# Patient Record
Sex: FEMALE | Race: WHITE | NOT HISPANIC OR LATINO | Employment: OTHER | URBAN - METROPOLITAN AREA
[De-identification: names, ages, dates, MRNs, and addresses within clinical notes are randomized per-mention and may not be internally consistent; named-entity substitution may affect disease eponyms.]

---

## 2018-01-01 ENCOUNTER — HOSPITAL ENCOUNTER (OUTPATIENT)
Facility: HOSPITAL | Age: 73
Discharge: HOME | End: 2018-12-13
Attending: RADIOLOGY | Admitting: RADIOLOGY

## 2018-01-01 ENCOUNTER — OFFICE VISIT (OUTPATIENT)
Dept: NEUROSURGERY | Facility: CLINIC | Age: 73
End: 2018-01-01

## 2018-01-01 VITALS
OXYGEN SATURATION: 95 % | TEMPERATURE: 97.2 F | DIASTOLIC BLOOD PRESSURE: 74 MMHG | SYSTOLIC BLOOD PRESSURE: 106 MMHG | BODY MASS INDEX: 30.34 KG/M2 | RESPIRATION RATE: 18 BRPM | HEART RATE: 67 BPM | HEIGHT: 64 IN | WEIGHT: 177.69 LBS

## 2018-01-01 VITALS
DIASTOLIC BLOOD PRESSURE: 87 MMHG | HEART RATE: 77 BPM | SYSTOLIC BLOOD PRESSURE: 186 MMHG | HEIGHT: 64 IN | OXYGEN SATURATION: 93 % | BODY MASS INDEX: 29.4 KG/M2 | WEIGHT: 172.2 LBS

## 2018-01-01 DIAGNOSIS — I65.23 BILATERAL CAROTID ARTERY STENOSIS: Primary | ICD-10-CM

## 2018-01-01 DIAGNOSIS — I65.23 BILATERAL CAROTID ARTERY STENOSIS: ICD-10-CM

## 2018-01-01 DIAGNOSIS — I67.1 CEREBRAL ANEURYSM, NONRUPTURED: ICD-10-CM

## 2018-01-01 LAB
ANION GAP SERPL CALCULATED.3IONS-SCNC: 5 MMOL/L (ref 3–11)
BUN BLD-MCNC: 22 MG/DL (ref 9–23)
BUN/CREAT SERPL: 20.8 (ref 7–25)
CALCIUM SPEC-SCNC: 9 MG/DL (ref 8.7–10.4)
CHLORIDE SERPL-SCNC: 105 MMOL/L (ref 99–109)
CO2 SERPL-SCNC: 28 MMOL/L (ref 20–31)
CREAT BLD-MCNC: 1.06 MG/DL (ref 0.6–1.3)
DEPRECATED RDW RBC AUTO: 51 FL (ref 37–54)
ERYTHROCYTE [DISTWIDTH] IN BLOOD BY AUTOMATED COUNT: 16.3 % (ref 11.3–14.5)
GFR SERPL CREATININE-BSD FRML MDRD: 51 ML/MIN/1.73
GLUCOSE BLD-MCNC: 105 MG/DL (ref 70–100)
HCT VFR BLD AUTO: 36.9 % (ref 34.5–44)
HGB BLD-MCNC: 11.3 G/DL (ref 11.5–15.5)
MCH RBC QN AUTO: 25.9 PG (ref 27–31)
MCHC RBC AUTO-ENTMCNC: 30.6 G/DL (ref 32–36)
MCV RBC AUTO: 84.6 FL (ref 80–99)
PLATELET # BLD AUTO: 265 10*3/MM3 (ref 150–450)
PMV BLD AUTO: 10.3 FL (ref 6–12)
POTASSIUM BLD-SCNC: 3.8 MMOL/L (ref 3.5–5.5)
RBC # BLD AUTO: 4.36 10*6/MM3 (ref 3.89–5.14)
SODIUM BLD-SCNC: 138 MMOL/L (ref 132–146)
WBC NRBC COR # BLD: 6.96 10*3/MM3 (ref 3.5–10.8)

## 2018-01-01 PROCEDURE — 36223 PLACE CATH CAROTID/INOM ART: CPT | Performed by: RADIOLOGY

## 2018-01-01 PROCEDURE — C1894 INTRO/SHEATH, NON-LASER: HCPCS | Performed by: RADIOLOGY

## 2018-01-01 PROCEDURE — 36226 PLACE CATH VERTEBRAL ART: CPT | Performed by: RADIOLOGY

## 2018-01-01 PROCEDURE — 25010000002 MIDAZOLAM PER 1 MG: Performed by: RADIOLOGY

## 2018-01-01 PROCEDURE — 36225 PLACE CATH SUBCLAVIAN ART: CPT | Performed by: RADIOLOGY

## 2018-01-01 PROCEDURE — 0 IODIXANOL PER 1 ML: Performed by: RADIOLOGY

## 2018-01-01 PROCEDURE — C1769 GUIDE WIRE: HCPCS | Performed by: RADIOLOGY

## 2018-01-01 PROCEDURE — 76377 3D RENDER W/INTRP POSTPROCES: CPT | Performed by: RADIOLOGY

## 2018-01-01 PROCEDURE — 85027 COMPLETE CBC AUTOMATED: CPT | Performed by: RADIOLOGY

## 2018-01-01 PROCEDURE — 80048 BASIC METABOLIC PNL TOTAL CA: CPT | Performed by: RADIOLOGY

## 2018-01-01 PROCEDURE — 25010000002 FENTANYL CITRATE (PF) 100 MCG/2ML SOLUTION: Performed by: RADIOLOGY

## 2018-01-01 PROCEDURE — 99203 OFFICE O/P NEW LOW 30 MIN: CPT | Performed by: NEUROLOGICAL SURGERY

## 2018-01-01 PROCEDURE — 25010000002 HYDRALAZINE PER 20 MG: Performed by: RADIOLOGY

## 2018-01-01 PROCEDURE — A9270 NON-COVERED ITEM OR SERVICE: HCPCS | Performed by: RADIOLOGY

## 2018-01-01 PROCEDURE — 36415 COLL VENOUS BLD VENIPUNCTURE: CPT

## 2018-01-01 PROCEDURE — 63710000001 ACETAMINOPHEN 325 MG TABLET: Performed by: RADIOLOGY

## 2018-01-01 PROCEDURE — 25010000002 HEPARIN (PORCINE) PER 1000 UNITS: Performed by: RADIOLOGY

## 2018-01-01 RX ORDER — IODIXANOL 320 MG/ML
INJECTION, SOLUTION INTRAVASCULAR AS NEEDED
Status: DISCONTINUED | OUTPATIENT
Start: 2018-01-01 | End: 2018-01-01 | Stop reason: HOSPADM

## 2018-01-01 RX ORDER — TERAZOSIN 10 MG/1
10 CAPSULE ORAL DAILY
Refills: 2 | COMMUNITY
Start: 2018-01-01

## 2018-01-01 RX ORDER — LIDOCAINE HYDROCHLORIDE 10 MG/ML
INJECTION, SOLUTION INFILTRATION; PERINEURAL AS NEEDED
Status: DISCONTINUED | OUTPATIENT
Start: 2018-01-01 | End: 2018-01-01 | Stop reason: HOSPADM

## 2018-01-01 RX ORDER — AMIODARONE HYDROCHLORIDE 200 MG/1
200 TABLET ORAL DAILY
Refills: 5 | COMMUNITY
Start: 2018-01-01

## 2018-01-01 RX ORDER — CILOSTAZOL 100 MG/1
1 TABLET ORAL DAILY
COMMUNITY
Start: 2018-01-01

## 2018-01-01 RX ORDER — ACETAMINOPHEN 325 MG/1
650 TABLET ORAL EVERY 6 HOURS PRN
Status: DISCONTINUED | OUTPATIENT
Start: 2018-01-01 | End: 2018-01-01 | Stop reason: HOSPADM

## 2018-01-01 RX ORDER — APIXABAN 5 MG/1
1 TABLET, FILM COATED ORAL 2 TIMES DAILY
COMMUNITY
Start: 2018-01-01 | End: 2019-01-01 | Stop reason: HOSPADM

## 2018-01-01 RX ORDER — CLONIDINE HYDROCHLORIDE 0.1 MG/1
1 TABLET ORAL AS NEEDED
COMMUNITY
Start: 2018-01-01

## 2018-01-01 RX ORDER — FENTANYL CITRATE 50 UG/ML
INJECTION, SOLUTION INTRAMUSCULAR; INTRAVENOUS AS NEEDED
Status: DISCONTINUED | OUTPATIENT
Start: 2018-01-01 | End: 2018-01-01 | Stop reason: HOSPADM

## 2018-01-01 RX ORDER — ALLOPURINOL 100 MG/1
1 TABLET ORAL AS NEEDED
Refills: 0 | COMMUNITY
Start: 2018-01-01

## 2018-01-01 RX ORDER — SODIUM CHLORIDE 9 MG/ML
75 INJECTION, SOLUTION INTRAVENOUS CONTINUOUS
Status: ACTIVE | OUTPATIENT
Start: 2018-01-01 | End: 2018-01-01

## 2018-01-01 RX ORDER — SODIUM CHLORIDE 0.9 % (FLUSH) 0.9 %
3-10 SYRINGE (ML) INJECTION AS NEEDED
Status: DISCONTINUED | OUTPATIENT
Start: 2018-01-01 | End: 2018-01-01 | Stop reason: HOSPADM

## 2018-01-01 RX ORDER — LEVALBUTEROL INHALATION SOLUTION 1.25 MG/3ML
SOLUTION RESPIRATORY (INHALATION) AS NEEDED
COMMUNITY
Start: 2018-01-01

## 2018-01-01 RX ORDER — LISINOPRIL AND HYDROCHLOROTHIAZIDE 20; 12.5 MG/1; MG/1
1 TABLET ORAL 2 TIMES DAILY
COMMUNITY
Start: 2018-01-01

## 2018-01-01 RX ORDER — ALBUTEROL SULFATE 90 UG/1
2 AEROSOL, METERED RESPIRATORY (INHALATION) EVERY 4 HOURS PRN
COMMUNITY

## 2018-01-01 RX ORDER — METOPROLOL SUCCINATE 25 MG/1
0.5 TABLET, EXTENDED RELEASE ORAL 2 TIMES DAILY
Refills: 5 | COMMUNITY
Start: 2018-01-01 | End: 2019-01-01 | Stop reason: HOSPADM

## 2018-01-01 RX ORDER — MIDAZOLAM HYDROCHLORIDE 1 MG/ML
INJECTION INTRAMUSCULAR; INTRAVENOUS AS NEEDED
Status: DISCONTINUED | OUTPATIENT
Start: 2018-01-01 | End: 2018-01-01 | Stop reason: HOSPADM

## 2018-01-01 RX ORDER — SODIUM CHLORIDE 0.9 % (FLUSH) 0.9 %
3 SYRINGE (ML) INJECTION EVERY 12 HOURS SCHEDULED
Status: DISCONTINUED | OUTPATIENT
Start: 2018-01-01 | End: 2018-01-01 | Stop reason: HOSPADM

## 2018-01-01 RX ORDER — SODIUM CHLORIDE 9 MG/ML
50 INJECTION, SOLUTION INTRAVENOUS CONTINUOUS
Status: DISCONTINUED | OUTPATIENT
Start: 2018-01-01 | End: 2018-01-01 | Stop reason: HOSPADM

## 2018-01-01 RX ORDER — ATORVASTATIN CALCIUM 40 MG/1
1 TABLET, FILM COATED ORAL DAILY
COMMUNITY
Start: 2018-01-01

## 2018-01-01 RX ORDER — HYDRALAZINE HYDROCHLORIDE 20 MG/ML
INJECTION INTRAMUSCULAR; INTRAVENOUS AS NEEDED
Status: DISCONTINUED | OUTPATIENT
Start: 2018-01-01 | End: 2018-01-01 | Stop reason: HOSPADM

## 2018-01-01 RX ADMIN — SODIUM CHLORIDE 50 ML/HR: 9 INJECTION, SOLUTION INTRAVENOUS at 08:04

## 2018-01-01 RX ADMIN — ACETAMINOPHEN 650 MG: 325 TABLET ORAL at 10:30

## 2018-11-29 PROBLEM — I65.23 BILATERAL CAROTID ARTERY STENOSIS: Status: ACTIVE | Noted: 2018-01-01

## 2018-11-29 PROBLEM — I67.1 CEREBRAL ANEURYSM, NONRUPTURED: Status: ACTIVE | Noted: 2018-01-01

## 2018-11-29 NOTE — PROGRESS NOTES
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  The studies were prompted by the presence of February.  She has a history of vascular disease and apparently has had revascularization of her lower extremities approximately 8 years ago.  She is had a return of her claudication.  She also has atrial fibrillation and is on anticoagulants.  She was a smoker until 3 days ago.     Past Medical History:   Diagnosis Date   • Arthritis    • Atrial fibrillation (CMS/HCC)    • Bronchitis    • Hearing loss    • Hypertension        Past Surgical History:   Procedure Laterality Date   • VEIN SURGERY  2011    Vein Graph @ [facility]       No family history on file.    Social History     Socioeconomic History   • Marital status:      Spouse name: Not on file   • Number of children: Not on file   • Years of education: Not on file   • Highest education level: Not on file   Social Needs   • Financial resource strain: Not on file   • Food insecurity - worry: Not on file   • Food insecurity - inability: Not on file   • Transportation needs - medical: Not on file   • Transportation needs - non-medical: Not on file   Occupational History   • Not on file   Tobacco Use   • Smoking status: Current Every Day Smoker     Packs/day: 1.00     Types: Cigarettes   • Smokeless tobacco: Never Used   Substance and Sexual Activity   • Alcohol use: No     Frequency: Never   • Drug use: No   • Sexual activity: Defer   Other Topics Concern   • Not on file   Social History Narrative   • Not on file       No Known Allergies      Current Outpatient Medications:   •  albuterol (PROVENTIL HFA;VENTOLIN HFA;PROAIR HFA) 108 (90 Base) MCG/ACT inhaler, Inhale 2 puffs Every 4 (Four) Hours As Needed for  "Wheezing., Disp: , Rfl:   •  amiodarone (PACERONE) 200 MG tablet, Take 200 mg by mouth Daily., Disp: , Rfl: 5  •  atorvastatin (LIPITOR) 40 MG tablet, Take 1 tablet by mouth Daily., Disp: , Rfl:   •  budesonide (PULMICORT) 90 MCG/ACT inhaler, Inhale 1 puff 2 (Two) Times a Day., Disp: , Rfl:   •  cilostazol (PLETAL) 100 MG tablet, Take 1 tablet by mouth Daily., Disp: , Rfl:   •  ELIQUIS 5 MG tablet tablet, Take 1 tablet by mouth 2 (Two) Times a Day., Disp: , Rfl:   •  lisinopril-hydrochlorothiazide (PRINZIDE,ZESTORETIC) 20-12.5 MG per tablet, Take 1 tablet by mouth 2 (Two) Times a Day., Disp: , Rfl:   •  metoprolol succinate XL (TOPROL-XL) 25 MG 24 hr tablet, Take 0.5 tablets by mouth 2 (Two) Times a Day. for blood pressure., Disp: , Rfl: 5  •  terazosin (HYTRIN) 10 MG capsule, Take 10 mg by mouth Daily., Disp: , Rfl: 2  •  allopurinol (ZYLOPRIM) 100 MG tablet, Take 1 tablet by mouth As Needed., Disp: , Rfl: 0  •  CloNIDine (CATAPRES) 0.1 MG tablet, Take 1 tablet by mouth As Needed., Disp: , Rfl:   •  levalbuterol (XOPENEX) 1.25 MG/3ML nebulizer solution, As Needed., Disp: , Rfl:     Review of Systems   Eyes: Positive for itching.   Respiratory: Positive for cough and wheezing.    Cardiovascular: Positive for palpitations.   Gastrointestinal: Positive for blood in stool.        Cholitis   Endocrine: Positive for polydipsia.   Musculoskeletal: Positive for arthralgias and myalgias.   All other systems reviewed and are negative.      Vitals:    11/29/18 1215   BP: (!) 186/87   BP Location: Left arm   Patient Position: Sitting   Cuff Size: Adult   Pulse: 77   SpO2: 93%   Weight: 78.1 kg (172 lb 3.2 oz)   Height: 162.6 cm (64\")       Neurological Examination:Mental status/speech: The patient is alert and oriented.  Speech is clear without aphysia or dysarthria.  No overt cognitive deficits.    Cranial nerve examination:    Olfaction: Smell is intact.  Vision: Vision is intact without visual field abnormalities.  " "Funduscopic examination is normal.  No pupillary irregularity.  Ocular motor examination: The extraocular muscles are intact.  There is no diplopia.  The pupil is round and reactive to both light and accommodation.  There is no nystagmus.  Facial movement/sensation: There is no facial weakness.  Sensation is intact in the first, second, and third divisions of the trigeminal nerve.  The corneal reflex is intact.  Auditory: Hearing is intact to finger rub bilaterally.  Cranial nerves IX, X, XI, XII: Phonation is normal.  No dysphagia.  Tongue is protruded in the midline without atrophy.  The gag reflex is intact.  Shoulder shrug is normal.    Musculoligamentous ligamentous examination: I find no evidence of weakness sensory loss or reflex asymmetry.  Gait is normal.               Medical Decision Making:     Diagnostic Data Set:   Diagnostic studies show heavy calcification was stenosis of 70 and 80% in the right and left internal carotid artery respectively.  There is also a question of an 7-8 millimeter basilar [place] aneurysm.      Assessment:  Vascular occlusive disease associated with aneurysmal formation; atrial fibrillation with anticoagulation; hypertension.          Recommendations:  I have recommended \"formal\" cerebral angiography.  We will get this scheduled as soon as possible.  The issues are whether or not she has significant carotid stenosis and what is the best management.  Also the presence of the basilar aneurysm is of concern.    She will need to be off her Eliquis prior to the studies.  We will keep you informed.        I greatly appreciate the opportunity to see and evaluate this individual.  If you have questions or concerns regarding issues that I may have overlooked please call me at any time: [phone].  Saman Alexander M.D.  [facility]  [address] [place] [address]    "

## 2019-01-01 ENCOUNTER — ANESTHESIA (OUTPATIENT)
Dept: GASTROENTEROLOGY | Facility: HOSPITAL | Age: 74
End: 2019-01-01

## 2019-01-01 ENCOUNTER — TRANSCRIBE ORDERS (OUTPATIENT)
Dept: NEUROSURGERY | Facility: CLINIC | Age: 74
End: 2019-01-01

## 2019-01-01 ENCOUNTER — OFFICE VISIT (OUTPATIENT)
Dept: NEUROSURGERY | Facility: CLINIC | Age: 74
End: 2019-01-01

## 2019-01-01 ENCOUNTER — APPOINTMENT (OUTPATIENT)
Dept: MRI IMAGING | Facility: HOSPITAL | Age: 74
End: 2019-01-01

## 2019-01-01 ENCOUNTER — TELEPHONE (OUTPATIENT)
Dept: NEUROSURGERY | Facility: CLINIC | Age: 74
End: 2019-01-01

## 2019-01-01 ENCOUNTER — READMISSION MANAGEMENT (OUTPATIENT)
Dept: CALL CENTER | Facility: HOSPITAL | Age: 74
End: 2019-01-01

## 2019-01-01 ENCOUNTER — APPOINTMENT (OUTPATIENT)
Dept: CARDIOLOGY | Facility: HOSPITAL | Age: 74
End: 2019-01-01
Attending: INTERNAL MEDICINE

## 2019-01-01 ENCOUNTER — PREP FOR SURGERY (OUTPATIENT)
Dept: OTHER | Facility: HOSPITAL | Age: 74
End: 2019-01-01

## 2019-01-01 ENCOUNTER — APPOINTMENT (OUTPATIENT)
Dept: CARDIOLOGY | Facility: HOSPITAL | Age: 74
End: 2019-01-01

## 2019-01-01 ENCOUNTER — APPOINTMENT (OUTPATIENT)
Dept: GENERAL RADIOLOGY | Facility: HOSPITAL | Age: 74
End: 2019-01-01

## 2019-01-01 ENCOUNTER — HOSPITAL ENCOUNTER (OUTPATIENT)
Facility: HOSPITAL | Age: 74
Setting detail: HOSPITAL OUTPATIENT SURGERY
Discharge: HOME | End: 2019-01-15
Attending: RADIOLOGY | Admitting: RADIOLOGY

## 2019-01-01 ENCOUNTER — HOSPITAL ENCOUNTER (INPATIENT)
Facility: HOSPITAL | Age: 74
LOS: 1 days | End: 2019-03-14
Attending: INTERNAL MEDICINE | Admitting: INTERNAL MEDICINE

## 2019-01-01 ENCOUNTER — ANESTHESIA EVENT (OUTPATIENT)
Dept: GASTROENTEROLOGY | Facility: HOSPITAL | Age: 74
End: 2019-01-01

## 2019-01-01 ENCOUNTER — HOSPITAL ENCOUNTER (OUTPATIENT)
Facility: HOSPITAL | Age: 74
Discharge: HOME | End: 2019-01-30
Attending: EMERGENCY MEDICINE | Admitting: EMERGENCY MEDICINE

## 2019-01-01 VITALS
HEIGHT: 63 IN | OXYGEN SATURATION: 99 % | BODY MASS INDEX: 31.89 KG/M2 | SYSTOLIC BLOOD PRESSURE: 161 MMHG | DIASTOLIC BLOOD PRESSURE: 76 MMHG | WEIGHT: 180 LBS | TEMPERATURE: 98 F | HEART RATE: 74 BPM | RESPIRATION RATE: 18 BRPM

## 2019-01-01 VITALS
OXYGEN SATURATION: 72 % | SYSTOLIC BLOOD PRESSURE: 58 MMHG | DIASTOLIC BLOOD PRESSURE: 29 MMHG | TEMPERATURE: 99.1 F | RESPIRATION RATE: 16 BRPM | BODY MASS INDEX: 36.41 KG/M2 | WEIGHT: 205.47 LBS | HEIGHT: 63 IN

## 2019-01-01 VITALS
TEMPERATURE: 97.2 F | OXYGEN SATURATION: 97 % | HEART RATE: 71 BPM | BODY MASS INDEX: 31.88 KG/M2 | DIASTOLIC BLOOD PRESSURE: 60 MMHG | WEIGHT: 179.9 LBS | RESPIRATION RATE: 16 BRPM | HEIGHT: 63 IN | SYSTOLIC BLOOD PRESSURE: 113 MMHG

## 2019-01-01 VITALS
WEIGHT: 170 LBS | DIASTOLIC BLOOD PRESSURE: 31 MMHG | TEMPERATURE: 98.3 F | HEIGHT: 64 IN | HEART RATE: 64 BPM | SYSTOLIC BLOOD PRESSURE: 144 MMHG | RESPIRATION RATE: 20 BRPM | BODY MASS INDEX: 29.02 KG/M2 | OXYGEN SATURATION: 98 %

## 2019-01-01 VITALS
DIASTOLIC BLOOD PRESSURE: 50 MMHG | WEIGHT: 178 LBS | SYSTOLIC BLOOD PRESSURE: 100 MMHG | BODY MASS INDEX: 30.39 KG/M2 | HEIGHT: 64 IN | TEMPERATURE: 97.2 F

## 2019-01-01 VITALS
RESPIRATION RATE: 18 BRPM | OXYGEN SATURATION: 99 % | BODY MASS INDEX: 30.9 KG/M2 | HEIGHT: 64 IN | TEMPERATURE: 98.6 F | WEIGHT: 181 LBS

## 2019-01-01 DIAGNOSIS — R27.0 ATAXIA: Primary | ICD-10-CM

## 2019-01-01 DIAGNOSIS — I65.22 CAROTID STENOSIS, ASYMPTOMATIC, LEFT: ICD-10-CM

## 2019-01-01 DIAGNOSIS — R26.2 IMPAIRED AMBULATION: ICD-10-CM

## 2019-01-01 DIAGNOSIS — I65.22 CAROTID STENOSIS, ASYMPTOMATIC, LEFT: Primary | ICD-10-CM

## 2019-01-01 DIAGNOSIS — K92.2 LOWER GI BLEED: ICD-10-CM

## 2019-01-01 DIAGNOSIS — M79.604 PAIN IN BOTH LOWER EXTREMITIES: ICD-10-CM

## 2019-01-01 DIAGNOSIS — K92.1 MELENA: ICD-10-CM

## 2019-01-01 DIAGNOSIS — D50.0 BLOOD LOSS ANEMIA: ICD-10-CM

## 2019-01-01 DIAGNOSIS — R29.898 WEAKNESS OF BOTH LOWER EXTREMITIES: Primary | ICD-10-CM

## 2019-01-01 DIAGNOSIS — I65.23 BILATERAL CAROTID ARTERY STENOSIS: Primary | ICD-10-CM

## 2019-01-01 DIAGNOSIS — M79.605 PAIN IN BOTH LOWER EXTREMITIES: ICD-10-CM

## 2019-01-01 DIAGNOSIS — Z91.81 HISTORY OF FALL: ICD-10-CM

## 2019-01-01 DIAGNOSIS — N17.9 ACUTE RENAL FAILURE, UNSPECIFIED ACUTE RENAL FAILURE TYPE (HCC): ICD-10-CM

## 2019-01-01 DIAGNOSIS — I67.1 CEREBRAL ANEURYSM, NONRUPTURED: ICD-10-CM

## 2019-01-01 DIAGNOSIS — D62 ACUTE BLOOD LOSS ANEMIA: ICD-10-CM

## 2019-01-01 DIAGNOSIS — R57.9 SHOCK (HCC): Primary | ICD-10-CM

## 2019-01-01 DIAGNOSIS — R26.81 GAIT INSTABILITY: ICD-10-CM

## 2019-01-01 DIAGNOSIS — N28.9 ACUTE RENAL INSUFFICIENCY: ICD-10-CM

## 2019-01-01 DIAGNOSIS — M51.36 DDD (DEGENERATIVE DISC DISEASE), LUMBAR: Primary | ICD-10-CM

## 2019-01-01 LAB
ABO + RH BLD: NORMAL
ABO GROUP BLD: NORMAL
ALBUMIN SERPL-MCNC: 3.1 G/DL (ref 3.2–4.8)
ALBUMIN SERPL-MCNC: 3.11 G/DL (ref 3.2–4.8)
ALBUMIN SERPL-MCNC: 3.94 G/DL (ref 3.2–4.8)
ALBUMIN/GLOB SERPL: 1.7 G/DL (ref 1.5–2.5)
ALBUMIN/GLOB SERPL: 1.7 G/DL (ref 1.5–2.5)
ALBUMIN/GLOB SERPL: 2 G/DL (ref 1.5–2.5)
ALP SERPL-CCNC: 37 U/L (ref 25–100)
ALP SERPL-CCNC: 51 U/L (ref 25–100)
ALP SERPL-CCNC: 53 U/L (ref 25–100)
ALT SERPL W P-5'-P-CCNC: 13 U/L (ref 7–40)
ALT SERPL W P-5'-P-CCNC: 27 U/L (ref 7–40)
ALT SERPL W P-5'-P-CCNC: 30 U/L (ref 7–40)
ANION GAP SERPL CALCULATED.3IONS-SCNC: 11 MMOL/L (ref 3–11)
ANION GAP SERPL CALCULATED.3IONS-SCNC: 12 MMOL/L (ref 3–11)
ANION GAP SERPL CALCULATED.3IONS-SCNC: 5 MMOL/L (ref 3–11)
ANION GAP SERPL CALCULATED.3IONS-SCNC: 5 MMOL/L (ref 3–11)
ANION GAP SERPL CALCULATED.3IONS-SCNC: 6 MMOL/L (ref 3–11)
ANION GAP SERPL CALCULATED.3IONS-SCNC: 7 MMOL/L (ref 3–11)
ARTERIAL PATENCY WRIST A: ABNORMAL
ARTICHOKE IGE QN: 61 MG/DL (ref 0–130)
AST SERPL-CCNC: 16 U/L (ref 0–33)
AST SERPL-CCNC: 30 U/L (ref 0–33)
AST SERPL-CCNC: 52 U/L (ref 0–33)
ATMOSPHERIC PRESS: ABNORMAL MMHG
BACTERIA SPEC AEROBE CULT: ABNORMAL
BACTERIA UR QL AUTO: ABNORMAL /HPF
BACTERIA UR QL AUTO: ABNORMAL /HPF
BASE EXCESS BLDA CALC-SCNC: -10.4 MMOL/L (ref 0–2)
BASE EXCESS BLDA CALC-SCNC: -10.6 MMOL/L (ref 0–2)
BASE EXCESS BLDA CALC-SCNC: -12.7 MMOL/L (ref 0–2)
BASE EXCESS BLDA CALC-SCNC: -8.5 MMOL/L (ref 0–2)
BASOPHILS # BLD AUTO: 0.01 10*3/MM3 (ref 0–0.2)
BASOPHILS # BLD AUTO: 0.02 10*3/MM3 (ref 0–0.2)
BASOPHILS # BLD AUTO: 0.03 10*3/MM3 (ref 0–0.2)
BASOPHILS NFR BLD AUTO: 0.1 % (ref 0–1)
BASOPHILS NFR BLD AUTO: 0.1 % (ref 0–1)
BASOPHILS NFR BLD AUTO: 0.4 % (ref 0–1)
BDY SITE: ABNORMAL
BH BB BLOOD EXPIRATION DATE: NORMAL
BH BB BLOOD TYPE BARCODE: 6200
BH BB DISPENSE STATUS: NORMAL
BH BB PRODUCT CODE: NORMAL
BH BB UNIT NUMBER: NORMAL
BH CV ECHO MEAS - AO MAX PG (FULL): 18.3 MMHG
BH CV ECHO MEAS - AO MAX PG: 27.9 MMHG
BH CV ECHO MEAS - AO MAX PG: 32.4 MMHG
BH CV ECHO MEAS - AO MEAN PG (FULL): 10.4 MMHG
BH CV ECHO MEAS - AO MEAN PG: 15.9 MMHG
BH CV ECHO MEAS - AO MEAN PG: 18 MMHG
BH CV ECHO MEAS - AO ROOT AREA (BSA CORRECTED): 1.4
BH CV ECHO MEAS - AO ROOT AREA (BSA CORRECTED): 1.5
BH CV ECHO MEAS - AO ROOT AREA: 5.4 CM^2
BH CV ECHO MEAS - AO ROOT AREA: 6.9 CM^2
BH CV ECHO MEAS - AO ROOT DIAM: 2.6 CM
BH CV ECHO MEAS - AO ROOT DIAM: 3 CM
BH CV ECHO MEAS - AO V2 MAX: 264.1 CM/SEC
BH CV ECHO MEAS - AO V2 MAX: 284.7 CM/SEC
BH CV ECHO MEAS - AO V2 MEAN: 191.7 CM/SEC
BH CV ECHO MEAS - AO V2 MEAN: 196 CM/SEC
BH CV ECHO MEAS - AO V2 VTI: 38.2 CM
BH CV ECHO MEAS - AO V2 VTI: 60.1 CM
BH CV ECHO MEAS - AVA(I,A): 1.8 CM^2
BH CV ECHO MEAS - AVA(I,D): 1.8 CM^2
BH CV ECHO MEAS - AVA(V,A): 1.8 CM^2
BH CV ECHO MEAS - AVA(V,D): 1.8 CM^2
BH CV ECHO MEAS - BSA(HAYCOCK): 1.9 M^2
BH CV ECHO MEAS - BSA(HAYCOCK): 2.1 M^2
BH CV ECHO MEAS - BSA: 1.8 M^2
BH CV ECHO MEAS - BSA: 1.9 M^2
BH CV ECHO MEAS - BZI_BMI: 29.2 KILOGRAMS/M^2
BH CV ECHO MEAS - BZI_BMI: 37.5 KILOGRAMS/M^2
BH CV ECHO MEAS - BZI_METRIC_HEIGHT: 157.5 CM
BH CV ECHO MEAS - BZI_METRIC_HEIGHT: 162.6 CM
BH CV ECHO MEAS - BZI_METRIC_WEIGHT: 77.1 KG
BH CV ECHO MEAS - BZI_METRIC_WEIGHT: 93 KG
BH CV ECHO MEAS - EDV(CUBED): 143.2 ML
BH CV ECHO MEAS - EDV(CUBED): 144.9 ML
BH CV ECHO MEAS - EDV(MOD-SP4): 69 ML
BH CV ECHO MEAS - EDV(TEICH): 131.3 ML
BH CV ECHO MEAS - EDV(TEICH): 132.6 ML
BH CV ECHO MEAS - EF(CUBED): 75.9 %
BH CV ECHO MEAS - EF(CUBED): 89.3 %
BH CV ECHO MEAS - EF(MOD-BP): 59 %
BH CV ECHO MEAS - EF(MOD-SP4): 59.4 %
BH CV ECHO MEAS - EF(TEICH): 67.5 %
BH CV ECHO MEAS - EF(TEICH): 83.2 %
BH CV ECHO MEAS - ESV(CUBED): 15.6 ML
BH CV ECHO MEAS - ESV(CUBED): 34.5 ML
BH CV ECHO MEAS - ESV(MOD-SP4): 28 ML
BH CV ECHO MEAS - ESV(TEICH): 22.2 ML
BH CV ECHO MEAS - ESV(TEICH): 42.7 ML
BH CV ECHO MEAS - FS: 37.8 %
BH CV ECHO MEAS - FS: 52.5 %
BH CV ECHO MEAS - IVS/LVPW: 0.7
BH CV ECHO MEAS - IVS/LVPW: 0.99
BH CV ECHO MEAS - IVSD: 1.1 CM
BH CV ECHO MEAS - IVSD: 1.2 CM
BH CV ECHO MEAS - LA DIMENSION: 4.1 CM
BH CV ECHO MEAS - LA DIMENSION: 5.1 CM
BH CV ECHO MEAS - LA/AO: 1.4
BH CV ECHO MEAS - LA/AO: 1.9
BH CV ECHO MEAS - LAD MAJOR: 5 CM
BH CV ECHO MEAS - LAD MAJOR: 6 CM
BH CV ECHO MEAS - LAT PEAK E' VEL: 12.2 CM/SEC
BH CV ECHO MEAS - LAT PEAK E' VEL: 7.5 CM/SEC
BH CV ECHO MEAS - LATERAL E/E' RATIO: 18.5
BH CV ECHO MEAS - LATERAL E/E' RATIO: 7.7
BH CV ECHO MEAS - LV DIASTOLIC VOL/BSA (35-75): 37.8 ML/M^2
BH CV ECHO MEAS - LV MASS(C)D: 185.8 GRAMS
BH CV ECHO MEAS - LV MASS(C)D: 245 GRAMS
BH CV ECHO MEAS - LV MASS(C)DI: 101.8 GRAMS/M^2
BH CV ECHO MEAS - LV MASS(C)DI: 126.8 GRAMS/M^2
BH CV ECHO MEAS - LV MAX PG: 9.6 MMHG
BH CV ECHO MEAS - LV MEAN PG: 5.6 MMHG
BH CV ECHO MEAS - LV SYSTOLIC VOL/BSA (12-30): 15.3 ML/M^2
BH CV ECHO MEAS - LV V1 MAX: 154.6 CM/SEC
BH CV ECHO MEAS - LV V1 MEAN: 110.1 CM/SEC
BH CV ECHO MEAS - LV V1 VTI: 35.3 CM
BH CV ECHO MEAS - LVIDD: 5.2 CM
BH CV ECHO MEAS - LVIDD: 5.3 CM
BH CV ECHO MEAS - LVIDS: 2.5 CM
BH CV ECHO MEAS - LVIDS: 3.3 CM
BH CV ECHO MEAS - LVLD AP4: 6.7 CM
BH CV ECHO MEAS - LVLS AP4: 6.3 CM
BH CV ECHO MEAS - LVOT AREA (M): 3.1 CM^2
BH CV ECHO MEAS - LVOT AREA (M): 3.1 CM^2
BH CV ECHO MEAS - LVOT AREA: 3.1 CM^2
BH CV ECHO MEAS - LVOT AREA: 3.2 CM^2
BH CV ECHO MEAS - LVOT DIAM: 2 CM
BH CV ECHO MEAS - LVOT DIAM: 2 CM
BH CV ECHO MEAS - LVPWD: 1.1 CM
BH CV ECHO MEAS - LVPWD: 1.2 CM
BH CV ECHO MEAS - MED PEAK E' VEL: 6.9 CM/SEC
BH CV ECHO MEAS - MED PEAK E' VEL: 7.9 CM/SEC
BH CV ECHO MEAS - MEDIAL E/E' RATIO: 13.3
BH CV ECHO MEAS - MEDIAL E/E' RATIO: 17.5
BH CV ECHO MEAS - MV A MAX VEL: 136.7 CM/SEC
BH CV ECHO MEAS - MV A MAX VEL: 158.4 CM/SEC
BH CV ECHO MEAS - MV DEC SLOPE: 275.1 CM/SEC^2
BH CV ECHO MEAS - MV DEC SLOPE: 505 CM/SEC^2
BH CV ECHO MEAS - MV DEC TIME: 0.29 SEC
BH CV ECHO MEAS - MV E MAX VEL: 140.7 CM/SEC
BH CV ECHO MEAS - MV E MAX VEL: 93.8 CM/SEC
BH CV ECHO MEAS - MV E/A: 0.69
BH CV ECHO MEAS - MV E/A: 0.89
BH CV ECHO MEAS - MV MAX PG: 10.4 MMHG
BH CV ECHO MEAS - MV MAX PG: 15.1 MMHG
BH CV ECHO MEAS - MV MEAN PG: 4.4 MMHG
BH CV ECHO MEAS - MV MEAN PG: 6.8 MMHG
BH CV ECHO MEAS - MV P1/2T MAX VEL: 107.2 CM/SEC
BH CV ECHO MEAS - MV P1/2T MAX VEL: 175.1 CM/SEC
BH CV ECHO MEAS - MV P1/2T: 101.6 MSEC
BH CV ECHO MEAS - MV P1/2T: 114.2 MSEC
BH CV ECHO MEAS - MV V2 MAX: 161.5 CM/SEC
BH CV ECHO MEAS - MV V2 MAX: 194.1 CM/SEC
BH CV ECHO MEAS - MV V2 MEAN: 123.5 CM/SEC
BH CV ECHO MEAS - MV V2 MEAN: 99.6 CM/SEC
BH CV ECHO MEAS - MV V2 VTI: 30.8 CM
BH CV ECHO MEAS - MV V2 VTI: 47.8 CM
BH CV ECHO MEAS - MVA P1/2T LCG: 1.3 CM^2
BH CV ECHO MEAS - MVA P1/2T LCG: 2.1 CM^2
BH CV ECHO MEAS - MVA(P1/2T): 1.9 CM^2
BH CV ECHO MEAS - MVA(P1/2T): 2.2 CM^2
BH CV ECHO MEAS - MVA(VTI): 2.3 CM^2
BH CV ECHO MEAS - PA ACC SLOPE: 412.5 CM/SEC^2
BH CV ECHO MEAS - PA ACC SLOPE: 755.4 CM/SEC^2
BH CV ECHO MEAS - PA ACC TIME: 0.13 SEC
BH CV ECHO MEAS - PA ACC TIME: 0.18 SEC
BH CV ECHO MEAS - PA PR(ACCEL): -3.4 MMHG
BH CV ECHO MEAS - PA PR(ACCEL): 22 MMHG
BH CV ECHO MEAS - RAP SYSTOLE: 15 MMHG
BH CV ECHO MEAS - RAP SYSTOLE: 3 MMHG
BH CV ECHO MEAS - RV MAX PG: 3 MMHG
BH CV ECHO MEAS - RV V1 MAX: 86.4 CM/SEC
BH CV ECHO MEAS - RVSP: 32 MMHG
BH CV ECHO MEAS - RVSP: 55 MMHG
BH CV ECHO MEAS - SI(AO): 137 ML/M^2
BH CV ECHO MEAS - SI(AO): 178.7 ML/M^2
BH CV ECHO MEAS - SI(CUBED): 59.5 ML/M^2
BH CV ECHO MEAS - SI(CUBED): 67 ML/M^2
BH CV ECHO MEAS - SI(LVOT): 60.6 ML/M^2
BH CV ECHO MEAS - SI(MOD-SP4): 22.5 ML/M^2
BH CV ECHO MEAS - SI(TEICH): 48.6 ML/M^2
BH CV ECHO MEAS - SI(TEICH): 57.1 ML/M^2
BH CV ECHO MEAS - SV(AO): 264.7 ML
BH CV ECHO MEAS - SV(AO): 326.2 ML
BH CV ECHO MEAS - SV(CUBED): 108.7 ML
BH CV ECHO MEAS - SV(CUBED): 129.4 ML
BH CV ECHO MEAS - SV(LVOT): 110.6 ML
BH CV ECHO MEAS - SV(MOD-SP4): 41 ML
BH CV ECHO MEAS - SV(TEICH): 110.3 ML
BH CV ECHO MEAS - SV(TEICH): 88.6 ML
BH CV ECHO MEAS - TAPSE (>1.6): 1.5 CM2
BH CV ECHO MEAS - TAPSE (>1.6): 2.2 CM2
BH CV ECHO MEAS - TR MAX PG: 29 MMHG
BH CV ECHO MEAS - TR MAX PG: 40 MMHG
BH CV ECHO MEAS - TR MAX VEL: 268.8 CM/SEC
BH CV ECHO MEAS - TR MAX VEL: 315.4 CM/SEC
BH CV ECHO MEASUREMENTS AVERAGE E/E' RATIO: 18.27
BH CV ECHO MEASUREMENTS AVERAGE E/E' RATIO: 9.82
BH CV VAS BP RIGHT ARM: NORMAL MMHG
BH CV XLRA - RV BASE: 3.8 CM
BH CV XLRA - RV BASE: 3.8 CM
BH CV XLRA - RV LENGTH: 6.5 CM
BH CV XLRA - RV LENGTH: 7.2 CM
BH CV XLRA - RV MID: 2.8 CM
BH CV XLRA - RV MID: 3.3 CM
BH CV XLRA - TDI S': 11.5 CM/SEC
BH CV XLRA - TDI S': 15.9 CM/SEC
BILIRUB CONJ SERPL-MCNC: 0.1 MG/DL (ref 0–0.2)
BILIRUB INDIRECT SERPL-MCNC: 0.1 MG/DL (ref 0.1–1.1)
BILIRUB SERPL-MCNC: 0.2 MG/DL (ref 0.3–1.2)
BILIRUB SERPL-MCNC: 0.2 MG/DL (ref 0.3–1.2)
BILIRUB SERPL-MCNC: 0.4 MG/DL (ref 0.3–1.2)
BILIRUB SERPL-MCNC: 0.5 MG/DL (ref 0.3–1.2)
BILIRUB UR QL STRIP: ABNORMAL
BILIRUB UR QL STRIP: NEGATIVE
BLD GP AB SCN SERPL QL: NEGATIVE
BLD GP AB SCN SERPL QL: NEGATIVE
BNP SERPL-MCNC: 165 PG/ML (ref 0–100)
BODY TEMPERATURE: 37 C
BUN BLD-MCNC: 18 MG/DL (ref 9–23)
BUN BLD-MCNC: 22 MG/DL (ref 9–23)
BUN BLD-MCNC: 27 MG/DL (ref 9–23)
BUN BLD-MCNC: 28 MG/DL (ref 9–23)
BUN BLD-MCNC: 36 MG/DL (ref 9–23)
BUN BLD-MCNC: 48 MG/DL (ref 9–23)
BUN/CREAT SERPL: 16.4 (ref 7–25)
BUN/CREAT SERPL: 20 (ref 7–25)
BUN/CREAT SERPL: 21.6 (ref 7–25)
BUN/CREAT SERPL: 23.1 (ref 7–25)
BUN/CREAT SERPL: 24.1 (ref 7–25)
BUN/CREAT SERPL: 24.4 (ref 7–25)
CA-I SERPL ISE-MCNC: 1.15 MMOL/L (ref 1.12–1.32)
CALCIUM SPEC-SCNC: 7.4 MG/DL (ref 8.7–10.4)
CALCIUM SPEC-SCNC: 7.8 MG/DL (ref 8.7–10.4)
CALCIUM SPEC-SCNC: 8.3 MG/DL (ref 8.7–10.4)
CALCIUM SPEC-SCNC: 8.4 MG/DL (ref 8.7–10.4)
CALCIUM SPEC-SCNC: 8.8 MG/DL (ref 8.7–10.4)
CALCIUM SPEC-SCNC: 9.2 MG/DL (ref 8.7–10.4)
CHLORIDE SERPL-SCNC: 103 MMOL/L (ref 99–109)
CHLORIDE SERPL-SCNC: 106 MMOL/L (ref 99–109)
CHLORIDE SERPL-SCNC: 107 MMOL/L (ref 99–109)
CHLORIDE SERPL-SCNC: 113 MMOL/L (ref 99–109)
CHLORIDE SERPL-SCNC: 113 MMOL/L (ref 99–109)
CHLORIDE SERPL-SCNC: 115 MMOL/L (ref 99–109)
CHOLEST SERPL-MCNC: 104 MG/DL (ref 0–200)
CLARITY UR: ABNORMAL
CLARITY UR: ABNORMAL
CO2 BLDA-SCNC: 18.5 MMOL/L (ref 23–27)
CO2 BLDA-SCNC: 19.5 MMOL/L (ref 23–27)
CO2 BLDA-SCNC: 19.9 MMOL/L (ref 23–27)
CO2 BLDA-SCNC: 20.9 MMOL/L (ref 23–27)
CO2 SERPL-SCNC: 17 MMOL/L (ref 20–31)
CO2 SERPL-SCNC: 18 MMOL/L (ref 20–31)
CO2 SERPL-SCNC: 25 MMOL/L (ref 20–31)
CO2 SERPL-SCNC: 27 MMOL/L (ref 20–31)
CO2 SERPL-SCNC: 27 MMOL/L (ref 20–31)
CO2 SERPL-SCNC: 30 MMOL/L (ref 20–31)
COHGB MFR BLD: 1.4 % (ref 0–2)
COHGB MFR BLD: 1.9 % (ref 0–2)
COHGB MFR BLD: 2.2 % (ref 0–2)
COHGB MFR BLD: 2.8 % (ref 0–2)
COLOR UR: ABNORMAL
COLOR UR: YELLOW
CORTIS SERPL-MCNC: 35.7 MCG/DL
CREAT BLD-MCNC: 1.02 MG/DL (ref 0.6–1.3)
CREAT BLD-MCNC: 1.1 MG/DL (ref 0.6–1.3)
CREAT BLD-MCNC: 1.16 MG/DL (ref 0.6–1.3)
CREAT BLD-MCNC: 1.35 MG/DL (ref 0.6–1.3)
CREAT BLD-MCNC: 1.56 MG/DL (ref 0.6–1.3)
CREAT BLD-MCNC: 1.97 MG/DL (ref 0.6–1.3)
CYTO UR: NORMAL
CYTO UR: NORMAL
D-LACTATE SERPL-SCNC: 2.2 MMOL/L (ref 0.5–2)
D-LACTATE SERPL-SCNC: 2.7 MMOL/L (ref 0.5–2)
D-LACTATE SERPL-SCNC: 3.3 MMOL/L (ref 0.5–2)
DEPRECATED RDW RBC AUTO: 48.3 FL (ref 37–54)
DEPRECATED RDW RBC AUTO: 49.2 FL (ref 37–54)
DEPRECATED RDW RBC AUTO: 49.5 FL (ref 37–54)
DEPRECATED RDW RBC AUTO: 77.3 FL (ref 37–54)
DEPRECATED RDW RBC AUTO: 78.1 FL (ref 37–54)
DEVELOPER EXPIRATION DATE: NORMAL
DEVELOPER LOT NUMBER: NORMAL
EOSINOPHIL # BLD AUTO: 0 10*3/MM3 (ref 0–0.3)
EOSINOPHIL # BLD AUTO: 0.01 10*3/MM3 (ref 0–0.3)
EOSINOPHIL # BLD AUTO: 0.13 10*3/MM3 (ref 0–0.3)
EOSINOPHIL NFR BLD AUTO: 0 % (ref 0–3)
EOSINOPHIL NFR BLD AUTO: 0.1 % (ref 0–3)
EOSINOPHIL NFR BLD AUTO: 1.7 % (ref 0–3)
ERYTHROCYTE [DISTWIDTH] IN BLOOD BY AUTOMATED COUNT: 15.9 % (ref 11.3–14.5)
ERYTHROCYTE [DISTWIDTH] IN BLOOD BY AUTOMATED COUNT: 16.1 % (ref 11.3–14.5)
ERYTHROCYTE [DISTWIDTH] IN BLOOD BY AUTOMATED COUNT: 16.2 % (ref 11.3–14.5)
ERYTHROCYTE [DISTWIDTH] IN BLOOD BY AUTOMATED COUNT: 24.3 % (ref 11.3–14.5)
ERYTHROCYTE [DISTWIDTH] IN BLOOD BY AUTOMATED COUNT: 24.4 % (ref 11.3–14.5)
EXPIRATION DATE: NORMAL
FECAL OCCULT BLOOD SCREEN, POC: NEGATIVE
FERRITIN SERPL-MCNC: 5 NG/ML (ref 10–291)
FOLATE SERPL-MCNC: 9.67 NG/ML (ref 3.2–20)
GFR SERPL CREATININE-BSD FRML MDRD: 25 ML/MIN/1.73
GFR SERPL CREATININE-BSD FRML MDRD: 33 ML/MIN/1.73
GFR SERPL CREATININE-BSD FRML MDRD: 39 ML/MIN/1.73
GFR SERPL CREATININE-BSD FRML MDRD: 46 ML/MIN/1.73
GFR SERPL CREATININE-BSD FRML MDRD: 49 ML/MIN/1.73
GFR SERPL CREATININE-BSD FRML MDRD: 53 ML/MIN/1.73
GLOBULIN UR ELPH-MCNC: 1.6 GM/DL
GLOBULIN UR ELPH-MCNC: 1.8 GM/DL
GLOBULIN UR ELPH-MCNC: 2.4 GM/DL
GLUCOSE BLD-MCNC: 120 MG/DL (ref 70–100)
GLUCOSE BLD-MCNC: 121 MG/DL (ref 70–100)
GLUCOSE BLD-MCNC: 133 MG/DL (ref 70–100)
GLUCOSE BLD-MCNC: 168 MG/DL (ref 70–100)
GLUCOSE BLD-MCNC: 175 MG/DL (ref 70–100)
GLUCOSE BLD-MCNC: 99 MG/DL (ref 70–100)
GLUCOSE UR STRIP-MCNC: NEGATIVE MG/DL
GLUCOSE UR STRIP-MCNC: NEGATIVE MG/DL
HCO3 BLDA-SCNC: 16.8 MMOL/L (ref 20–26)
HCO3 BLDA-SCNC: 18 MMOL/L (ref 20–26)
HCO3 BLDA-SCNC: 18.3 MMOL/L (ref 20–26)
HCO3 BLDA-SCNC: 19.4 MMOL/L (ref 20–26)
HCT VFR BLD AUTO: 22.7 % (ref 34.5–44)
HCT VFR BLD AUTO: 24.3 % (ref 34.5–44)
HCT VFR BLD AUTO: 24.6 % (ref 34.5–44)
HCT VFR BLD AUTO: 25.3 % (ref 34.5–44)
HCT VFR BLD AUTO: 25.4 % (ref 34.5–44)
HCT VFR BLD AUTO: 25.8 % (ref 34.5–44)
HCT VFR BLD AUTO: 25.9 % (ref 34.5–44)
HCT VFR BLD AUTO: 25.9 % (ref 34.5–44)
HCT VFR BLD AUTO: 30.4 % (ref 34.5–44)
HCT VFR BLD AUTO: 39.1 % (ref 34.5–44)
HCT VFR BLD AUTO: 39.6 % (ref 34.5–44)
HCT VFR BLD CALC: 32.8 %
HCT VFR BLD CALC: 34.5 %
HCT VFR BLD CALC: 35 %
HCT VFR BLD CALC: 37.1 %
HDLC SERPL-MCNC: 42 MG/DL (ref 40–60)
HGB BLD-MCNC: 10.9 G/DL (ref 11.5–15.5)
HGB BLD-MCNC: 11.2 G/DL (ref 11.5–15.5)
HGB BLD-MCNC: 6.8 G/DL (ref 11.5–15.5)
HGB BLD-MCNC: 7.5 G/DL (ref 11.5–15.5)
HGB BLD-MCNC: 7.7 G/DL (ref 11.5–15.5)
HGB BLD-MCNC: 7.7 G/DL (ref 11.5–15.5)
HGB BLD-MCNC: 7.8 G/DL (ref 11.5–15.5)
HGB BLD-MCNC: 7.8 G/DL (ref 11.5–15.5)
HGB BLD-MCNC: 7.9 G/DL (ref 11.5–15.5)
HGB BLD-MCNC: 7.9 G/DL (ref 11.5–15.5)
HGB BLD-MCNC: 9.2 G/DL (ref 11.5–15.5)
HGB BLDA-MCNC: 10.7 G/DL (ref 14–18)
HGB BLDA-MCNC: 11.3 G/DL (ref 14–18)
HGB BLDA-MCNC: 11.4 G/DL (ref 14–18)
HGB BLDA-MCNC: 12.1 G/DL (ref 14–18)
HGB UR QL STRIP.AUTO: ABNORMAL
HGB UR QL STRIP.AUTO: NEGATIVE
HOLD SPECIMEN: NORMAL
HOROWITZ INDEX BLD+IHG-RTO: 65 %
HOROWITZ INDEX BLD+IHG-RTO: 70 %
HYALINE CASTS UR QL AUTO: ABNORMAL /LPF
IMM GRANULOCYTES # BLD AUTO: 0.01 10*3/MM3 (ref 0–0.03)
IMM GRANULOCYTES # BLD AUTO: 0.04 10*3/MM3 (ref 0–0.05)
IMM GRANULOCYTES # BLD AUTO: 0.13 10*3/MM3 (ref 0–0.05)
IMM GRANULOCYTES NFR BLD AUTO: 0.1 % (ref 0–0.6)
IMM GRANULOCYTES NFR BLD AUTO: 0.3 % (ref 0–0.6)
IMM GRANULOCYTES NFR BLD AUTO: 0.7 % (ref 0–0.6)
INR PPP: 1.97 (ref 0.85–1.16)
IRON 24H UR-MRATE: 12 MCG/DL (ref 50–175)
IRON SATN MFR SERPL: 3 % (ref 15–50)
KETONES UR QL STRIP: NEGATIVE
KETONES UR QL STRIP: NEGATIVE
LAB AP CASE REPORT: NORMAL
LAB AP CASE REPORT: NORMAL
LAB AP CLINICAL INFORMATION: NORMAL
LAB AP CLINICAL INFORMATION: NORMAL
LEFT ATRIUM VOLUME INDEX: 26.4 ML/M^2
LEFT ATRIUM VOLUME INDEX: 42.2 ML/M^2
LEFT ATRIUM VOLUME: 51 ML
LEFT ATRIUM VOLUME: 77 ML
LEUKOCYTE ESTERASE UR QL STRIP.AUTO: ABNORMAL
LEUKOCYTE ESTERASE UR QL STRIP.AUTO: ABNORMAL
LV EF 2D ECHO EST: 68 %
LV EF 2D ECHO EST: 75 %
LYMPHOCYTES # BLD AUTO: 0.65 10*3/MM3 (ref 0.6–4.8)
LYMPHOCYTES # BLD AUTO: 0.81 10*3/MM3 (ref 0.6–4.8)
LYMPHOCYTES # BLD AUTO: 1.63 10*3/MM3 (ref 0.6–4.8)
LYMPHOCYTES NFR BLD AUTO: 21 % (ref 24–44)
LYMPHOCYTES NFR BLD AUTO: 4.2 % (ref 24–44)
LYMPHOCYTES NFR BLD AUTO: 5.5 % (ref 24–44)
Lab: ABNORMAL
Lab: NORMAL
MAGNESIUM SERPL-MCNC: 2 MG/DL (ref 1.3–2.7)
MAGNESIUM SERPL-MCNC: 2.5 MG/DL (ref 1.3–2.7)
MAXIMAL PREDICTED HEART RATE: 147 BPM
MAXIMAL PREDICTED HEART RATE: 148 BPM
MCH RBC QN AUTO: 24.8 PG (ref 27–31)
MCH RBC QN AUTO: 25.5 PG (ref 27–31)
MCH RBC QN AUTO: 25.5 PG (ref 27–31)
MCH RBC QN AUTO: 25.6 PG (ref 27–31)
MCH RBC QN AUTO: 25.6 PG (ref 27–31)
MCHC RBC AUTO-ENTMCNC: 27.9 G/DL (ref 32–36)
MCHC RBC AUTO-ENTMCNC: 28.3 G/DL (ref 32–36)
MCHC RBC AUTO-ENTMCNC: 30 G/DL (ref 32–36)
MCHC RBC AUTO-ENTMCNC: 30.3 G/DL (ref 32–36)
MCHC RBC AUTO-ENTMCNC: 30.9 G/DL (ref 32–36)
MCV RBC AUTO: 82.8 FL (ref 80–99)
MCV RBC AUTO: 82.9 FL (ref 80–99)
MCV RBC AUTO: 84.7 FL (ref 80–99)
MCV RBC AUTO: 90 FL (ref 80–99)
MCV RBC AUTO: 91.4 FL (ref 80–99)
METHGB BLD QL: 0.6 % (ref 0–1.5)
METHGB BLD QL: 0.8 % (ref 0–1.5)
METHGB BLD QL: 0.8 % (ref 0–1.5)
METHGB BLD QL: 1 % (ref 0–1.5)
MODALITY: ABNORMAL
MONOCYTES # BLD AUTO: 0.61 10*3/MM3 (ref 0–1)
MONOCYTES # BLD AUTO: 0.78 10*3/MM3 (ref 0–1)
MONOCYTES # BLD AUTO: 0.85 10*3/MM3 (ref 0–1)
MONOCYTES NFR BLD AUTO: 4.4 % (ref 0–12)
MONOCYTES NFR BLD AUTO: 6.7 % (ref 0–12)
MONOCYTES NFR BLD AUTO: 7.9 % (ref 0–12)
NEGATIVE CONTROL: NEGATIVE
NEUTROPHILS # BLD AUTO: 10.28 10*3/MM3 (ref 1.5–8.3)
NEUTROPHILS # BLD AUTO: 17.55 10*3/MM3 (ref 1.5–8.3)
NEUTROPHILS # BLD AUTO: 5.35 10*3/MM3 (ref 1.5–8.3)
NEUTROPHILS NFR BLD AUTO: 69 % (ref 41–71)
NEUTROPHILS NFR BLD AUTO: 87.7 % (ref 41–71)
NEUTROPHILS NFR BLD AUTO: 91.2 % (ref 41–71)
NITRITE UR QL STRIP: POSITIVE
NITRITE UR QL STRIP: POSITIVE
NOTE: ABNORMAL
NOTIFIED BY: ABNORMAL
NOTIFIED WHO: ABNORMAL
OXYHGB MFR BLDV: 86.6 % (ref 94–99)
OXYHGB MFR BLDV: 88.9 % (ref 94–99)
OXYHGB MFR BLDV: 91.8 % (ref 94–99)
OXYHGB MFR BLDV: 93.9 % (ref 94–99)
PATH REPORT.FINAL DX SPEC: NORMAL
PATH REPORT.FINAL DX SPEC: NORMAL
PATH REPORT.GROSS SPEC: NORMAL
PATH REPORT.GROSS SPEC: NORMAL
PCO2 BLDA: 49 MM HG (ref 35–45)
PCO2 BLDA: 49.1 MM HG (ref 35–45)
PCO2 BLDA: 53 MM HG (ref 35–45)
PCO2 BLDA: 54.7 MM HG (ref 35–45)
PCO2 TEMP ADJ BLD: 49 MM HG (ref 35–45)
PCO2 TEMP ADJ BLD: 49.1 MM HG (ref 35–45)
PCO2 TEMP ADJ BLD: 53 MM HG (ref 35–45)
PCO2 TEMP ADJ BLD: 54.7 MM HG (ref 35–45)
PEEP RESPIRATORY: 8 CM[H2O]
PH BLDA: 7.1 PH UNITS (ref 7.35–7.45)
PH BLDA: 7.15 PH UNITS (ref 7.35–7.45)
PH BLDA: 7.17 PH UNITS (ref 7.35–7.45)
PH BLDA: 7.2 PH UNITS (ref 7.35–7.45)
PH UR STRIP.AUTO: <=5 [PH] (ref 5–8)
PH UR STRIP.AUTO: <=5 [PH] (ref 5–8)
PH, TEMP CORRECTED: 7.1 PH UNITS
PH, TEMP CORRECTED: 7.15 PH UNITS
PH, TEMP CORRECTED: 7.17 PH UNITS
PH, TEMP CORRECTED: 7.2 PH UNITS
PHOSPHATE SERPL-MCNC: 6.6 MG/DL (ref 2.4–5.1)
PLATELET # BLD AUTO: 267 10*3/MM3 (ref 150–450)
PLATELET # BLD AUTO: 271 10*3/MM3 (ref 150–450)
PLATELET # BLD AUTO: 345 10*3/MM3 (ref 150–450)
PLATELET # BLD AUTO: 361 10*3/MM3 (ref 150–450)
PLATELET # BLD AUTO: 414 10*3/MM3 (ref 150–450)
PMV BLD AUTO: 10 FL (ref 6–12)
PMV BLD AUTO: 10.3 FL (ref 6–12)
PMV BLD AUTO: 10.5 FL (ref 6–12)
PMV BLD AUTO: 9.2 FL (ref 6–12)
PMV BLD AUTO: 9.3 FL (ref 6–12)
PO2 BLDA: 65.8 MM HG (ref 83–108)
PO2 BLDA: 67.9 MM HG (ref 83–108)
PO2 BLDA: 89.9 MM HG (ref 83–108)
PO2 BLDA: 95.5 MM HG (ref 83–108)
PO2 TEMP ADJ BLD: 65.8 MM HG (ref 83–108)
PO2 TEMP ADJ BLD: 67.9 MM HG (ref 83–108)
PO2 TEMP ADJ BLD: 89.9 MM HG (ref 83–108)
PO2 TEMP ADJ BLD: 95.5 MM HG (ref 83–108)
POSITIVE CONTROL: POSITIVE
POTASSIUM BLD-SCNC: 3.9 MMOL/L (ref 3.5–5.5)
POTASSIUM BLD-SCNC: 3.9 MMOL/L (ref 3.5–5.5)
POTASSIUM BLD-SCNC: 4 MMOL/L (ref 3.5–5.5)
POTASSIUM BLD-SCNC: 4 MMOL/L (ref 3.5–5.5)
POTASSIUM BLD-SCNC: 4.2 MMOL/L (ref 3.5–5.5)
POTASSIUM BLD-SCNC: 4.4 MMOL/L (ref 3.5–5.5)
PROCALCITONIN SERPL-MCNC: 4.59 NG/ML
PROT SERPL-MCNC: 4.7 G/DL (ref 5.7–8.2)
PROT SERPL-MCNC: 4.9 G/DL (ref 5.7–8.2)
PROT SERPL-MCNC: 6.3 G/DL (ref 5.7–8.2)
PROT UR QL STRIP: ABNORMAL
PROT UR QL STRIP: NEGATIVE
PROTHROMBIN TIME: 21.6 SECONDS (ref 11.2–14.3)
RBC # BLD AUTO: 2.74 10*6/MM3 (ref 3.89–5.14)
RBC # BLD AUTO: 2.93 10*6/MM3 (ref 3.89–5.14)
RBC # BLD AUTO: 3.59 10*6/MM3 (ref 3.89–5.14)
RBC # BLD AUTO: 4.28 10*6/MM3 (ref 3.89–5.14)
RBC # BLD AUTO: 4.4 10*6/MM3 (ref 3.89–5.14)
RBC # UR: ABNORMAL /HPF
RBC # UR: ABNORMAL /HPF
REF LAB TEST METHOD: ABNORMAL
REF LAB TEST METHOD: ABNORMAL
RETICS/RBC NFR AUTO: 2.34 % (ref 0.5–1.5)
RH BLD: POSITIVE
SET MECH RESP RATE: 24
SODIUM BLD-SCNC: 138 MMOL/L (ref 132–146)
SODIUM BLD-SCNC: 139 MMOL/L (ref 132–146)
SODIUM BLD-SCNC: 139 MMOL/L (ref 132–146)
SODIUM BLD-SCNC: 143 MMOL/L (ref 132–146)
SODIUM BLD-SCNC: 143 MMOL/L (ref 132–146)
SODIUM BLD-SCNC: 145 MMOL/L (ref 132–146)
SP GR UR STRIP: 1.02 (ref 1–1.03)
SP GR UR STRIP: 1.02 (ref 1–1.03)
SQUAMOUS #/AREA URNS HPF: ABNORMAL /HPF
SQUAMOUS #/AREA URNS HPF: ABNORMAL /HPF
STRESS TARGET HR: 125 BPM
STRESS TARGET HR: 126 BPM
T&S EXPIRATION DATE: NORMAL
T&S EXPIRATION DATE: NORMAL
TIBC SERPL-MCNC: 401 MCG/DL (ref 250–450)
TOTAL RATE: 24 BREATHS/MINUTE
TRIGL SERPL-MCNC: 91 MG/DL (ref 0–150)
TROPONIN I SERPL-MCNC: 0 NG/ML (ref 0–0.07)
TROPONIN I SERPL-MCNC: 0.05 NG/ML
TSH SERPL DL<=0.05 MIU/L-ACNC: 1.29 MIU/ML (ref 0.35–5.35)
UNIT  ABO: NORMAL
UNIT  RH: NORMAL
UROBILINOGEN UR QL STRIP: ABNORMAL
UROBILINOGEN UR QL STRIP: ABNORMAL
VENTILATOR MODE: ABNORMAL
VIT B12 BLD-MCNC: 220 PG/ML (ref 211–911)
VT ON VENT VENT: 0.36 ML
WBC NRBC COR # BLD: 10.89 10*3/MM3 (ref 3.5–10.8)
WBC NRBC COR # BLD: 11.72 10*3/MM3 (ref 3.5–10.8)
WBC NRBC COR # BLD: 19.24 10*3/MM3 (ref 3.5–10.8)
WBC NRBC COR # BLD: 7.23 10*3/MM3 (ref 3.5–10.8)
WBC NRBC COR # BLD: 7.75 10*3/MM3 (ref 3.5–10.8)
WBC UR QL AUTO: ABNORMAL /HPF
WBC UR QL AUTO: ABNORMAL /HPF
WHOLE BLOOD HOLD SPECIMEN: NORMAL
WHOLE BLOOD HOLD SPECIMEN: NORMAL

## 2019-01-01 PROCEDURE — 83605 ASSAY OF LACTIC ACID: CPT | Performed by: NURSE PRACTITIONER

## 2019-01-01 PROCEDURE — 93306 TTE W/DOPPLER COMPLETE: CPT | Performed by: INTERNAL MEDICINE

## 2019-01-01 PROCEDURE — 25010000002 PHENYLEPHRINE 10 MG/ML SOLUTION: Performed by: INTERNAL MEDICINE

## 2019-01-01 PROCEDURE — 83880 ASSAY OF NATRIURETIC PEPTIDE: CPT | Performed by: EMERGENCY MEDICINE

## 2019-01-01 PROCEDURE — 85025 COMPLETE CBC W/AUTO DIFF WBC: CPT | Performed by: NURSE PRACTITIONER

## 2019-01-01 PROCEDURE — 85014 HEMATOCRIT: CPT | Performed by: INTERNAL MEDICINE

## 2019-01-01 PROCEDURE — 63710000001 AMIODARONE 200 MG TABLET: Performed by: INTERNAL MEDICINE

## 2019-01-01 PROCEDURE — 25010000002 NA FERRIC GLUC CPLX PER 12.5 MG: Performed by: PHYSICIAN ASSISTANT

## 2019-01-01 PROCEDURE — 36430 TRANSFUSION BLD/BLD COMPNT: CPT

## 2019-01-01 PROCEDURE — 25010000002 CEFTRIAXONE PER 250 MG: Performed by: NURSE PRACTITIONER

## 2019-01-01 PROCEDURE — 84484 ASSAY OF TROPONIN QUANT: CPT

## 2019-01-01 PROCEDURE — 82805 BLOOD GASES W/O2 SATURATION: CPT

## 2019-01-01 PROCEDURE — 25010000002 FENTANYL CITRATE (PF) 2500 MCG/50ML SOLUTION: Performed by: INTERNAL MEDICINE

## 2019-01-01 PROCEDURE — 85025 COMPLETE CBC W/AUTO DIFF WBC: CPT | Performed by: EMERGENCY MEDICINE

## 2019-01-01 PROCEDURE — 96374 THER/PROPH/DIAG INJ IV PUSH: CPT

## 2019-01-01 PROCEDURE — P9016 RBC LEUKOCYTES REDUCED: HCPCS

## 2019-01-01 PROCEDURE — C1766 INTRO/SHEATH,STRBLE,NON-PEEL: HCPCS | Performed by: RADIOLOGY

## 2019-01-01 PROCEDURE — 75710 ARTERY X-RAYS ARM/LEG: CPT | Performed by: RADIOLOGY

## 2019-01-01 PROCEDURE — 82248 BILIRUBIN DIRECT: CPT | Performed by: INTERNAL MEDICINE

## 2019-01-01 PROCEDURE — 25010000002 ALBUMIN HUMAN 5% PER 50 ML: Performed by: NURSE PRACTITIONER

## 2019-01-01 PROCEDURE — A9270 NON-COVERED ITEM OR SERVICE: HCPCS | Performed by: INTERNAL MEDICINE

## 2019-01-01 PROCEDURE — 70551 MRI BRAIN STEM W/O DYE: CPT

## 2019-01-01 PROCEDURE — 85018 HEMOGLOBIN: CPT | Performed by: INTERNAL MEDICINE

## 2019-01-01 PROCEDURE — 99232 SBSQ HOSP IP/OBS MODERATE 35: CPT | Performed by: INTERNAL MEDICINE

## 2019-01-01 PROCEDURE — 99223 1ST HOSP IP/OBS HIGH 75: CPT | Performed by: INTERNAL MEDICINE

## 2019-01-01 PROCEDURE — 86901 BLOOD TYPING SEROLOGIC RH(D): CPT | Performed by: EMERGENCY MEDICINE

## 2019-01-01 PROCEDURE — 86850 RBC ANTIBODY SCREEN: CPT | Performed by: NURSE PRACTITIONER

## 2019-01-01 PROCEDURE — 0 IODIXANOL PER 1 ML: Performed by: RADIOLOGY

## 2019-01-01 PROCEDURE — 82330 ASSAY OF CALCIUM: CPT | Performed by: NURSE PRACTITIONER

## 2019-01-01 PROCEDURE — 86900 BLOOD TYPING SEROLOGIC ABO: CPT

## 2019-01-01 PROCEDURE — G0378 HOSPITAL OBSERVATION PER HR: HCPCS

## 2019-01-01 PROCEDURE — 71045 X-RAY EXAM CHEST 1 VIEW: CPT

## 2019-01-01 PROCEDURE — 25010000002 PIPERACILLIN SOD-TAZOBACTAM PER 1 G: Performed by: NURSE PRACTITIONER

## 2019-01-01 PROCEDURE — 86923 COMPATIBILITY TEST ELECTRIC: CPT

## 2019-01-01 PROCEDURE — 25010000002 MORPHINE PER 10 MG: Performed by: RADIOLOGY

## 2019-01-01 PROCEDURE — 94003 VENT MGMT INPAT SUBQ DAY: CPT

## 2019-01-01 PROCEDURE — 25010000002 LORAZEPAM PER 2 MG: Performed by: INTERNAL MEDICINE

## 2019-01-01 PROCEDURE — 99213 OFFICE O/P EST LOW 20 MIN: CPT | Performed by: NEUROLOGICAL SURGERY

## 2019-01-01 PROCEDURE — 63710000001 LISINOPRIL 20 MG TABLET: Performed by: INTERNAL MEDICINE

## 2019-01-01 PROCEDURE — 84100 ASSAY OF PHOSPHORUS: CPT | Performed by: NURSE PRACTITIONER

## 2019-01-01 PROCEDURE — 80048 BASIC METABOLIC PNL TOTAL CA: CPT | Performed by: INTERNAL MEDICINE

## 2019-01-01 PROCEDURE — 94799 UNLISTED PULMONARY SVC/PX: CPT

## 2019-01-01 PROCEDURE — 85045 AUTOMATED RETICULOCYTE COUNT: CPT | Performed by: INTERNAL MEDICINE

## 2019-01-01 PROCEDURE — 25010000002 VANCOMYCIN 10 G RECONSTITUTED SOLUTION: Performed by: NURSE PRACTITIONER

## 2019-01-01 PROCEDURE — 87086 URINE CULTURE/COLONY COUNT: CPT | Performed by: EMERGENCY MEDICINE

## 2019-01-01 PROCEDURE — 80061 LIPID PANEL: CPT | Performed by: NURSE PRACTITIONER

## 2019-01-01 PROCEDURE — 80053 COMPREHEN METABOLIC PANEL: CPT | Performed by: EMERGENCY MEDICINE

## 2019-01-01 PROCEDURE — 87205 SMEAR GRAM STAIN: CPT | Performed by: INTERNAL MEDICINE

## 2019-01-01 PROCEDURE — 25010000002 METHYLPREDNISOLONE PER 40 MG: Performed by: EMERGENCY MEDICINE

## 2019-01-01 PROCEDURE — 99284 EMERGENCY DEPT VISIT MOD MDM: CPT

## 2019-01-01 PROCEDURE — 83550 IRON BINDING TEST: CPT | Performed by: INTERNAL MEDICINE

## 2019-01-01 PROCEDURE — 88342 IMHCHEM/IMCYTCHM 1ST ANTB: CPT | Performed by: INTERNAL MEDICINE

## 2019-01-01 PROCEDURE — 81001 URINALYSIS AUTO W/SCOPE: CPT | Performed by: EMERGENCY MEDICINE

## 2019-01-01 PROCEDURE — 82270 OCCULT BLOOD FECES: CPT | Performed by: EMERGENCY MEDICINE

## 2019-01-01 PROCEDURE — 86900 BLOOD TYPING SEROLOGIC ABO: CPT | Performed by: NURSE PRACTITIONER

## 2019-01-01 PROCEDURE — 36222 PLACE CATH CAROTID/INOM ART: CPT | Performed by: RADIOLOGY

## 2019-01-01 PROCEDURE — 80048 BASIC METABOLIC PNL TOTAL CA: CPT | Performed by: RADIOLOGY

## 2019-01-01 PROCEDURE — 03HY32Z INSERTION OF MONITORING DEVICE INTO UPPER ARTERY, PERCUTANEOUS APPROACH: ICD-10-PCS | Performed by: INTERNAL MEDICINE

## 2019-01-01 PROCEDURE — 88305 TISSUE EXAM BY PATHOLOGIST: CPT | Performed by: INTERNAL MEDICINE

## 2019-01-01 PROCEDURE — 85027 COMPLETE CBC AUTOMATED: CPT | Performed by: INTERNAL MEDICINE

## 2019-01-01 PROCEDURE — 25010000002 HYDROCORTISONE SODIUM SUCCINATE 100 MG RECONSTITUTED SOLUTION: Performed by: INTERNAL MEDICINE

## 2019-01-01 PROCEDURE — C1894 INTRO/SHEATH, NON-LASER: HCPCS | Performed by: RADIOLOGY

## 2019-01-01 PROCEDURE — 36620 INSERTION CATHETER ARTERY: CPT | Performed by: NURSE PRACTITIONER

## 2019-01-01 PROCEDURE — 82247 BILIRUBIN TOTAL: CPT | Performed by: INTERNAL MEDICINE

## 2019-01-01 PROCEDURE — 84443 ASSAY THYROID STIM HORMONE: CPT | Performed by: EMERGENCY MEDICINE

## 2019-01-01 PROCEDURE — 99239 HOSP IP/OBS DSCHRG MGMT >30: CPT | Performed by: INTERNAL MEDICINE

## 2019-01-01 PROCEDURE — 82728 ASSAY OF FERRITIN: CPT | Performed by: INTERNAL MEDICINE

## 2019-01-01 PROCEDURE — 84145 PROCALCITONIN (PCT): CPT | Performed by: NURSE PRACTITIONER

## 2019-01-01 PROCEDURE — 93306 TTE W/DOPPLER COMPLETE: CPT

## 2019-01-01 PROCEDURE — 94660 CPAP INITIATION&MGMT: CPT

## 2019-01-01 PROCEDURE — 85610 PROTHROMBIN TIME: CPT | Performed by: NURSE PRACTITIONER

## 2019-01-01 PROCEDURE — 36600 WITHDRAWAL OF ARTERIAL BLOOD: CPT

## 2019-01-01 PROCEDURE — 25010000002 MIDAZOLAM PER 1 MG: Performed by: RADIOLOGY

## 2019-01-01 PROCEDURE — 99291 CRITICAL CARE FIRST HOUR: CPT | Performed by: INTERNAL MEDICINE

## 2019-01-01 PROCEDURE — 63710000001 PREDNISONE PER 1 MG: Performed by: INTERNAL MEDICINE

## 2019-01-01 PROCEDURE — 25010000002 ALBUMIN HUMAN 5% PER 50 ML

## 2019-01-01 PROCEDURE — 82607 VITAMIN B-12: CPT | Performed by: INTERNAL MEDICINE

## 2019-01-01 PROCEDURE — 99222 1ST HOSP IP/OBS MODERATE 55: CPT | Performed by: PHYSICIAN ASSISTANT

## 2019-01-01 PROCEDURE — 25010000002 PROPOFOL 10 MG/ML EMULSION: Performed by: NURSE ANESTHETIST, CERTIFIED REGISTERED

## 2019-01-01 PROCEDURE — 0BH17EZ INSERTION OF ENDOTRACHEAL AIRWAY INTO TRACHEA, VIA NATURAL OR ARTIFICIAL OPENING: ICD-10-PCS | Performed by: INTERNAL MEDICINE

## 2019-01-01 PROCEDURE — 87070 CULTURE OTHR SPECIMN AEROBIC: CPT | Performed by: INTERNAL MEDICINE

## 2019-01-01 PROCEDURE — 82746 ASSAY OF FOLIC ACID SERUM: CPT | Performed by: INTERNAL MEDICINE

## 2019-01-01 PROCEDURE — 31500 INSERT EMERGENCY AIRWAY: CPT

## 2019-01-01 PROCEDURE — 83540 ASSAY OF IRON: CPT | Performed by: INTERNAL MEDICINE

## 2019-01-01 PROCEDURE — 94640 AIRWAY INHALATION TREATMENT: CPT

## 2019-01-01 PROCEDURE — 96376 TX/PRO/DX INJ SAME DRUG ADON: CPT

## 2019-01-01 PROCEDURE — 84484 ASSAY OF TROPONIN QUANT: CPT | Performed by: INTERNAL MEDICINE

## 2019-01-01 PROCEDURE — 25010000002 DOPAMINE PER 40 MG: Performed by: NURSE PRACTITIONER

## 2019-01-01 PROCEDURE — 83735 ASSAY OF MAGNESIUM: CPT | Performed by: NURSE PRACTITIONER

## 2019-01-01 PROCEDURE — 25010000002 HYDRALAZINE PER 20 MG: Performed by: NURSE PRACTITIONER

## 2019-01-01 PROCEDURE — 85027 COMPLETE CBC AUTOMATED: CPT | Performed by: RADIOLOGY

## 2019-01-01 PROCEDURE — 25010000002 FENTANYL CITRATE (PF) 100 MCG/2ML SOLUTION: Performed by: RADIOLOGY

## 2019-01-01 PROCEDURE — 82533 TOTAL CORTISOL: CPT | Performed by: INTERNAL MEDICINE

## 2019-01-01 PROCEDURE — C1769 GUIDE WIRE: HCPCS | Performed by: RADIOLOGY

## 2019-01-01 PROCEDURE — 5A1935Z RESPIRATORY VENTILATION, LESS THAN 24 CONSECUTIVE HOURS: ICD-10-PCS | Performed by: INTERNAL MEDICINE

## 2019-01-01 PROCEDURE — 86901 BLOOD TYPING SEROLOGIC RH(D): CPT

## 2019-01-01 PROCEDURE — 96375 TX/PRO/DX INJ NEW DRUG ADDON: CPT

## 2019-01-01 PROCEDURE — 86850 RBC ANTIBODY SCREEN: CPT | Performed by: EMERGENCY MEDICINE

## 2019-01-01 PROCEDURE — 87186 SC STD MICRODIL/AGAR DIL: CPT | Performed by: EMERGENCY MEDICINE

## 2019-01-01 PROCEDURE — 87077 CULTURE AEROBIC IDENTIFY: CPT | Performed by: EMERGENCY MEDICINE

## 2019-01-01 PROCEDURE — 63710000001 ALLOPURINOL 100 MG TABLET: Performed by: INTERNAL MEDICINE

## 2019-01-01 PROCEDURE — 93005 ELECTROCARDIOGRAM TRACING: CPT | Performed by: EMERGENCY MEDICINE

## 2019-01-01 PROCEDURE — 81001 URINALYSIS AUTO W/SCOPE: CPT | Performed by: INTERNAL MEDICINE

## 2019-01-01 PROCEDURE — 63710000001 CILOSTAZOL 50 MG TABLET: Performed by: INTERNAL MEDICINE

## 2019-01-01 PROCEDURE — 80053 COMPREHEN METABOLIC PANEL: CPT | Performed by: NURSE PRACTITIONER

## 2019-01-01 PROCEDURE — 36415 COLL VENOUS BLD VENIPUNCTURE: CPT

## 2019-01-01 PROCEDURE — 86901 BLOOD TYPING SEROLOGIC RH(D): CPT | Performed by: NURSE PRACTITIONER

## 2019-01-01 PROCEDURE — 86900 BLOOD TYPING SEROLOGIC ABO: CPT | Performed by: EMERGENCY MEDICINE

## 2019-01-01 PROCEDURE — P9041 ALBUMIN (HUMAN),5%, 50ML: HCPCS

## 2019-01-01 PROCEDURE — 36140 INTRO NDL ICATH UPR/LXTR ART: CPT | Performed by: RADIOLOGY

## 2019-01-01 PROCEDURE — 83735 ASSAY OF MAGNESIUM: CPT | Performed by: EMERGENCY MEDICINE

## 2019-01-01 PROCEDURE — 94002 VENT MGMT INPAT INIT DAY: CPT

## 2019-01-01 PROCEDURE — 87040 BLOOD CULTURE FOR BACTERIA: CPT | Performed by: NURSE PRACTITIONER

## 2019-01-01 PROCEDURE — 87086 URINE CULTURE/COLONY COUNT: CPT | Performed by: INTERNAL MEDICINE

## 2019-01-01 PROCEDURE — P9041 ALBUMIN (HUMAN),5%, 50ML: HCPCS | Performed by: NURSE PRACTITIONER

## 2019-01-01 DEVICE — DEV CLIP ENDO RESOLUTION360 CONTRL ROT 235CM: Type: IMPLANTABLE DEVICE | Site: ASCENDING COLON | Status: FUNCTIONAL

## 2019-01-01 RX ORDER — SODIUM CHLORIDE 0.9 % (FLUSH) 0.9 %
1-10 SYRINGE (ML) INJECTION AS NEEDED
Status: CANCELLED | OUTPATIENT
Start: 2019-01-01

## 2019-01-01 RX ORDER — SODIUM CHLORIDE, SODIUM LACTATE, POTASSIUM CHLORIDE, CALCIUM CHLORIDE 600; 310; 30; 20 MG/100ML; MG/100ML; MG/100ML; MG/100ML
9 INJECTION, SOLUTION INTRAVENOUS CONTINUOUS
Status: CANCELLED | OUTPATIENT
Start: 2019-01-01

## 2019-01-01 RX ORDER — NOREPINEPHRINE BIT/0.9 % NACL 8 MG/250ML
.02-.3 INFUSION BOTTLE (ML) INTRAVENOUS
Status: DISCONTINUED | OUTPATIENT
Start: 2019-01-01 | End: 2019-01-01

## 2019-01-01 RX ORDER — METHYLPREDNISOLONE SODIUM SUCCINATE 40 MG/ML
80 INJECTION, POWDER, LYOPHILIZED, FOR SOLUTION INTRAMUSCULAR; INTRAVENOUS ONCE
Status: COMPLETED | OUTPATIENT
Start: 2019-01-01 | End: 2019-01-01

## 2019-01-01 RX ORDER — LIDOCAINE HYDROCHLORIDE 10 MG/ML
0.5 INJECTION, SOLUTION EPIDURAL; INFILTRATION; INTRACAUDAL; PERINEURAL ONCE AS NEEDED
Status: CANCELLED | OUTPATIENT
Start: 2019-01-01

## 2019-01-01 RX ORDER — CILOSTAZOL 50 MG/1
100 TABLET ORAL DAILY
Status: DISCONTINUED | OUTPATIENT
Start: 2019-01-01 | End: 2019-01-01 | Stop reason: HOSPADM

## 2019-01-01 RX ORDER — ONDANSETRON 2 MG/ML
4 INJECTION INTRAMUSCULAR; INTRAVENOUS ONCE AS NEEDED
Status: DISCONTINUED | OUTPATIENT
Start: 2019-01-01 | End: 2019-01-01 | Stop reason: HOSPADM

## 2019-01-01 RX ORDER — ALBUMIN (HUMAN) 12.5 G/50ML
50 SOLUTION INTRAVENOUS ONCE
Status: DISCONTINUED | OUTPATIENT
Start: 2019-01-01 | End: 2019-01-01

## 2019-01-01 RX ORDER — IPRATROPIUM BROMIDE AND ALBUTEROL SULFATE 2.5; .5 MG/3ML; MG/3ML
3 SOLUTION RESPIRATORY (INHALATION) EVERY 4 HOURS PRN
Status: DISCONTINUED | OUTPATIENT
Start: 2019-01-01 | End: 2019-01-01

## 2019-01-01 RX ORDER — NYSTATIN 100000 [USP'U]/G
POWDER TOPICAL EVERY 12 HOURS SCHEDULED
Status: DISCONTINUED | OUTPATIENT
Start: 2019-01-01 | End: 2019-01-01 | Stop reason: HOSPADM

## 2019-01-01 RX ORDER — DOPAMINE HYDROCHLORIDE 160 MG/100ML
INJECTION, SOLUTION INTRAVENOUS
Status: COMPLETED
Start: 2019-01-01 | End: 2019-01-01

## 2019-01-01 RX ORDER — IPRATROPIUM BROMIDE AND ALBUTEROL SULFATE 2.5; .5 MG/3ML; MG/3ML
3 SOLUTION RESPIRATORY (INHALATION) ONCE
Status: COMPLETED | OUTPATIENT
Start: 2019-01-01 | End: 2019-01-01

## 2019-01-01 RX ORDER — VANCOMYCIN HYDROCHLORIDE 1 G/200ML
1000 INJECTION, SOLUTION INTRAVENOUS EVERY 24 HOURS
Status: DISCONTINUED | OUTPATIENT
Start: 2019-01-01 | End: 2019-01-01

## 2019-01-01 RX ORDER — NABUMETONE 750 MG/1
750 TABLET, FILM COATED ORAL 2 TIMES DAILY
Qty: 60 TABLET | Refills: 0 | Status: SHIPPED | OUTPATIENT
Start: 2019-01-01

## 2019-01-01 RX ORDER — CEFTRIAXONE SODIUM 1 G/50ML
1 INJECTION, SOLUTION INTRAVENOUS NIGHTLY
Status: DISCONTINUED | OUTPATIENT
Start: 2019-01-01 | End: 2019-01-01

## 2019-01-01 RX ORDER — PROPOFOL 10 MG/ML
VIAL (ML) INTRAVENOUS AS NEEDED
Status: DISCONTINUED | OUTPATIENT
Start: 2019-01-01 | End: 2019-01-01 | Stop reason: SURG

## 2019-01-01 RX ORDER — CEFUROXIME AXETIL 250 MG/1
250 TABLET ORAL EVERY 12 HOURS SCHEDULED
Status: DISCONTINUED | OUTPATIENT
Start: 2019-01-01 | End: 2019-01-01 | Stop reason: HOSPADM

## 2019-01-01 RX ORDER — BUDESONIDE 0.5 MG/2ML
0.5 INHALANT ORAL
Status: DISCONTINUED | OUTPATIENT
Start: 2019-01-01 | End: 2019-01-01 | Stop reason: HOSPADM

## 2019-01-01 RX ORDER — LIDOCAINE HYDROCHLORIDE 10 MG/ML
0.5 INJECTION, SOLUTION EPIDURAL; INFILTRATION; INTRACAUDAL; PERINEURAL ONCE AS NEEDED
Status: DISCONTINUED | OUTPATIENT
Start: 2019-01-01 | End: 2019-01-01 | Stop reason: HOSPADM

## 2019-01-01 RX ORDER — LORAZEPAM 2 MG/ML
2 INJECTION INTRAMUSCULAR EVERY 4 HOURS PRN
Status: DISCONTINUED | OUTPATIENT
Start: 2019-01-01 | End: 2019-01-01 | Stop reason: HOSPADM

## 2019-01-01 RX ORDER — SODIUM CHLORIDE 0.9 % (FLUSH) 0.9 %
3 SYRINGE (ML) INJECTION EVERY 12 HOURS SCHEDULED
Status: DISCONTINUED | OUTPATIENT
Start: 2019-01-01 | End: 2019-01-01 | Stop reason: HOSPADM

## 2019-01-01 RX ORDER — DOPAMINE HYDROCHLORIDE 160 MG/100ML
2-20 INJECTION, SOLUTION INTRAVENOUS
Status: DISCONTINUED | OUTPATIENT
Start: 2019-01-01 | End: 2019-01-01

## 2019-01-01 RX ORDER — PANTOPRAZOLE SODIUM 40 MG/10ML
40 INJECTION, POWDER, LYOPHILIZED, FOR SOLUTION INTRAVENOUS
Status: DISCONTINUED | OUTPATIENT
Start: 2019-01-01 | End: 2019-01-01 | Stop reason: HOSPADM

## 2019-01-01 RX ORDER — PREDNISONE 20 MG/1
40 TABLET ORAL DAILY
Status: DISCONTINUED | OUTPATIENT
Start: 2019-01-01 | End: 2019-01-01 | Stop reason: HOSPADM

## 2019-01-01 RX ORDER — PANTOPRAZOLE SODIUM 40 MG/10ML
40 INJECTION, POWDER, LYOPHILIZED, FOR SOLUTION INTRAVENOUS EVERY 12 HOURS SCHEDULED
Status: DISCONTINUED | OUTPATIENT
Start: 2019-01-01 | End: 2019-01-01 | Stop reason: HOSPADM

## 2019-01-01 RX ORDER — PEG-3350, SODIUM SULFATE, SODIUM CHLORIDE, POTASSIUM CHLORIDE, SODIUM ASCORBATE AND ASCORBIC ACID 7.5-2.691G
1000 KIT ORAL
Status: COMPLETED | OUTPATIENT
Start: 2019-01-01 | End: 2019-01-01

## 2019-01-01 RX ORDER — LIDOCAINE HYDROCHLORIDE 10 MG/ML
INJECTION, SOLUTION EPIDURAL; INFILTRATION; INTRACAUDAL; PERINEURAL AS NEEDED
Status: DISCONTINUED | OUTPATIENT
Start: 2019-01-01 | End: 2019-01-01 | Stop reason: SURG

## 2019-01-01 RX ORDER — ALBUMIN, HUMAN INJ 5% 5 %
500 SOLUTION INTRAVENOUS ONCE
Status: COMPLETED | OUTPATIENT
Start: 2019-01-01 | End: 2019-01-01

## 2019-01-01 RX ORDER — SODIUM CHLORIDE 9 MG/ML
50 INJECTION, SOLUTION INTRAVENOUS CONTINUOUS
Status: DISCONTINUED | OUTPATIENT
Start: 2019-01-01 | End: 2019-01-01 | Stop reason: HOSPADM

## 2019-01-01 RX ORDER — SODIUM CHLORIDE 0.9 % (FLUSH) 0.9 %
3-10 SYRINGE (ML) INJECTION AS NEEDED
Status: CANCELLED | OUTPATIENT
Start: 2019-01-01

## 2019-01-01 RX ORDER — SODIUM CHLORIDE 9 MG/ML
100 INJECTION, SOLUTION INTRAVENOUS CONTINUOUS
Status: DISCONTINUED | OUTPATIENT
Start: 2019-01-01 | End: 2019-01-01

## 2019-01-01 RX ORDER — SODIUM CHLORIDE, SODIUM LACTATE, POTASSIUM CHLORIDE, CALCIUM CHLORIDE 600; 310; 30; 20 MG/100ML; MG/100ML; MG/100ML; MG/100ML
20 INJECTION, SOLUTION INTRAVENOUS ONCE
Status: COMPLETED | OUTPATIENT
Start: 2019-01-01 | End: 2019-01-01

## 2019-01-01 RX ORDER — METOPROLOL SUCCINATE 50 MG/1
50 TABLET, EXTENDED RELEASE ORAL
Status: DISCONTINUED | OUTPATIENT
Start: 2019-01-01 | End: 2019-01-01 | Stop reason: HOSPADM

## 2019-01-01 RX ORDER — CLOPIDOGREL BISULFATE 75 MG/1
300 TABLET ORAL ONCE
Status: COMPLETED | OUTPATIENT
Start: 2019-01-01 | End: 2019-01-01

## 2019-01-01 RX ORDER — HYDRALAZINE HYDROCHLORIDE 20 MG/ML
10 INJECTION INTRAMUSCULAR; INTRAVENOUS ONCE
Status: COMPLETED | OUTPATIENT
Start: 2019-01-01 | End: 2019-01-01

## 2019-01-01 RX ORDER — SODIUM CHLORIDE 9 MG/ML
100 INJECTION, SOLUTION INTRAVENOUS CONTINUOUS
Status: ACTIVE | OUTPATIENT
Start: 2019-01-01 | End: 2019-01-01

## 2019-01-01 RX ORDER — GABAPENTIN 300 MG/1
300 CAPSULE ORAL 2 TIMES DAILY
COMMUNITY

## 2019-01-01 RX ORDER — METOPROLOL SUCCINATE 50 MG/1
50 TABLET, EXTENDED RELEASE ORAL
Qty: 30 TABLET | Refills: 0 | Status: SHIPPED | OUTPATIENT
Start: 2019-01-01

## 2019-01-01 RX ORDER — BISACODYL 10 MG
10 SUPPOSITORY, RECTAL RECTAL EVERY 8 HOURS SCHEDULED
Status: DISCONTINUED | OUTPATIENT
Start: 2019-01-01 | End: 2019-01-01

## 2019-01-01 RX ORDER — SODIUM CHLORIDE 0.9 % (FLUSH) 0.9 %
3 SYRINGE (ML) INJECTION EVERY 12 HOURS SCHEDULED
Status: CANCELLED | OUTPATIENT
Start: 2019-01-01

## 2019-01-01 RX ORDER — MORPHINE SULFATE 2 MG/ML
2 INJECTION, SOLUTION INTRAMUSCULAR; INTRAVENOUS EVERY 4 HOURS PRN
Status: DISCONTINUED | OUTPATIENT
Start: 2019-01-01 | End: 2019-01-01 | Stop reason: HOSPADM

## 2019-01-01 RX ORDER — PANTOPRAZOLE SODIUM 40 MG/1
40 TABLET, DELAYED RELEASE ORAL DAILY
Qty: 30 TABLET | Refills: 0 | Status: SHIPPED | OUTPATIENT
Start: 2019-01-01

## 2019-01-01 RX ORDER — ATORVASTATIN CALCIUM 40 MG/1
40 TABLET, FILM COATED ORAL NIGHTLY
Status: DISCONTINUED | OUTPATIENT
Start: 2019-01-01 | End: 2019-01-01 | Stop reason: HOSPADM

## 2019-01-01 RX ORDER — SODIUM CHLORIDE 0.9 % (FLUSH) 0.9 %
3-10 SYRINGE (ML) INJECTION AS NEEDED
Status: DISCONTINUED | OUTPATIENT
Start: 2019-01-01 | End: 2019-01-01 | Stop reason: HOSPADM

## 2019-01-01 RX ORDER — PANTOPRAZOLE SODIUM 40 MG/1
40 TABLET, DELAYED RELEASE ORAL
Status: DISCONTINUED | OUTPATIENT
Start: 2019-01-01 | End: 2019-01-01 | Stop reason: HOSPADM

## 2019-01-01 RX ORDER — SODIUM CHLORIDE, SODIUM LACTATE, POTASSIUM CHLORIDE, CALCIUM CHLORIDE 600; 310; 30; 20 MG/100ML; MG/100ML; MG/100ML; MG/100ML
150 INJECTION, SOLUTION INTRAVENOUS CONTINUOUS
Status: DISCONTINUED | OUTPATIENT
Start: 2019-01-01 | End: 2019-01-01

## 2019-01-01 RX ORDER — IODIXANOL 320 MG/ML
INJECTION, SOLUTION INTRAVASCULAR AS NEEDED
Status: DISCONTINUED | OUTPATIENT
Start: 2019-01-01 | End: 2019-01-01 | Stop reason: HOSPADM

## 2019-01-01 RX ORDER — IPRATROPIUM BROMIDE AND ALBUTEROL SULFATE 2.5; .5 MG/3ML; MG/3ML
3 SOLUTION RESPIRATORY (INHALATION) EVERY 6 HOURS PRN
Status: DISCONTINUED | OUTPATIENT
Start: 2019-01-01 | End: 2019-01-01

## 2019-01-01 RX ORDER — GLYCOPYRROLATE 0.2 MG/ML
0.4 INJECTION INTRAMUSCULAR; INTRAVENOUS EVERY 4 HOURS PRN
Status: DISCONTINUED | OUTPATIENT
Start: 2019-01-01 | End: 2019-01-01 | Stop reason: HOSPADM

## 2019-01-01 RX ORDER — LORAZEPAM 2 MG/ML
2 INJECTION INTRAMUSCULAR ONCE
Status: COMPLETED | OUTPATIENT
Start: 2019-01-01 | End: 2019-01-01

## 2019-01-01 RX ORDER — ALLOPURINOL 100 MG/1
100 TABLET ORAL ONCE
Status: COMPLETED | OUTPATIENT
Start: 2019-01-01 | End: 2019-01-01

## 2019-01-01 RX ORDER — METOPROLOL SUCCINATE 25 MG/1
12.5 TABLET, EXTENDED RELEASE ORAL
Status: DISCONTINUED | OUTPATIENT
Start: 2019-01-01 | End: 2019-01-01

## 2019-01-01 RX ORDER — SODIUM CHLORIDE 9 MG/ML
75 INJECTION, SOLUTION INTRAVENOUS CONTINUOUS
Status: ACTIVE | OUTPATIENT
Start: 2019-01-01 | End: 2019-01-01

## 2019-01-01 RX ORDER — IPRATROPIUM BROMIDE AND ALBUTEROL SULFATE 2.5; .5 MG/3ML; MG/3ML
3 SOLUTION RESPIRATORY (INHALATION)
Status: DISCONTINUED | OUTPATIENT
Start: 2019-01-01 | End: 2019-01-01 | Stop reason: HOSPADM

## 2019-01-01 RX ORDER — SODIUM CHLORIDE 9 MG/ML
50 INJECTION, SOLUTION INTRAVENOUS CONTINUOUS
Status: CANCELLED | OUTPATIENT
Start: 2019-01-01

## 2019-01-01 RX ORDER — CHLORHEXIDINE GLUCONATE 0.12 MG/ML
15 RINSE ORAL EVERY 12 HOURS SCHEDULED
Status: DISCONTINUED | OUTPATIENT
Start: 2019-01-01 | End: 2019-01-01

## 2019-01-01 RX ORDER — SODIUM CHLORIDE 0.9 % (FLUSH) 0.9 %
1-10 SYRINGE (ML) INJECTION AS NEEDED
Status: DISCONTINUED | OUTPATIENT
Start: 2019-01-01 | End: 2019-01-01 | Stop reason: HOSPADM

## 2019-01-01 RX ORDER — IPRATROPIUM BROMIDE AND ALBUTEROL SULFATE 2.5; .5 MG/3ML; MG/3ML
3 SOLUTION RESPIRATORY (INHALATION)
Status: DISCONTINUED | OUTPATIENT
Start: 2019-01-01 | End: 2019-01-01

## 2019-01-01 RX ORDER — ALBUTEROL SULFATE 2.5 MG/3ML
2.5 SOLUTION RESPIRATORY (INHALATION) EVERY 6 HOURS PRN
Status: DISCONTINUED | OUTPATIENT
Start: 2019-01-01 | End: 2019-01-01 | Stop reason: HOSPADM

## 2019-01-01 RX ORDER — SODIUM CHLORIDE, SODIUM LACTATE, POTASSIUM CHLORIDE, CALCIUM CHLORIDE 600; 310; 30; 20 MG/100ML; MG/100ML; MG/100ML; MG/100ML
INJECTION, SOLUTION INTRAVENOUS CONTINUOUS PRN
Status: DISCONTINUED | OUTPATIENT
Start: 2019-01-01 | End: 2019-01-01 | Stop reason: SURG

## 2019-01-01 RX ORDER — IPRATROPIUM BROMIDE AND ALBUTEROL SULFATE 2.5; .5 MG/3ML; MG/3ML
3 SOLUTION RESPIRATORY (INHALATION) EVERY 4 HOURS PRN
Status: DISCONTINUED | OUTPATIENT
Start: 2019-01-01 | End: 2019-01-01 | Stop reason: HOSPADM

## 2019-01-01 RX ORDER — ETOMIDATE 2 MG/ML
INJECTION INTRAVENOUS
Status: COMPLETED
Start: 2019-01-01 | End: 2019-01-01

## 2019-01-01 RX ORDER — DOCUSATE SODIUM 50 MG/5 ML
100 LIQUID (ML) ORAL 2 TIMES DAILY
Status: DISCONTINUED | OUTPATIENT
Start: 2019-01-01 | End: 2019-01-01

## 2019-01-01 RX ORDER — LISINOPRIL 20 MG/1
20 TABLET ORAL
Status: DISCONTINUED | OUTPATIENT
Start: 2019-01-01 | End: 2019-01-01 | Stop reason: HOSPADM

## 2019-01-01 RX ORDER — AMIODARONE HYDROCHLORIDE 200 MG/1
200 TABLET ORAL DAILY
Status: DISCONTINUED | OUTPATIENT
Start: 2019-01-01 | End: 2019-01-01 | Stop reason: HOSPADM

## 2019-01-01 RX ORDER — PHENYLEPHRINE HCL IN 0.9% NACL 0.5 MG/5ML
.5-3 SYRINGE (ML) INTRAVENOUS
Status: DISCONTINUED | OUTPATIENT
Start: 2019-01-01 | End: 2019-01-01

## 2019-01-01 RX ORDER — DOCUSATE SODIUM 100 MG/1
100 CAPSULE, LIQUID FILLED ORAL 2 TIMES DAILY
Status: DISCONTINUED | OUTPATIENT
Start: 2019-01-01 | End: 2019-01-01 | Stop reason: ALTCHOICE

## 2019-01-01 RX ORDER — MIDAZOLAM HYDROCHLORIDE 1 MG/ML
INJECTION INTRAMUSCULAR; INTRAVENOUS AS NEEDED
Status: DISCONTINUED | OUTPATIENT
Start: 2019-01-01 | End: 2019-01-01 | Stop reason: HOSPADM

## 2019-01-01 RX ORDER — FENTANYL CITRATE 50 UG/ML
INJECTION, SOLUTION INTRAMUSCULAR; INTRAVENOUS AS NEEDED
Status: DISCONTINUED | OUTPATIENT
Start: 2019-01-01 | End: 2019-01-01 | Stop reason: HOSPADM

## 2019-01-01 RX ORDER — ALBUMIN, HUMAN INJ 5% 5 %
SOLUTION INTRAVENOUS
Status: COMPLETED
Start: 2019-01-01 | End: 2019-01-01

## 2019-01-01 RX ORDER — ASPIRIN 81 MG/1
81 TABLET, CHEWABLE ORAL DAILY
Status: DISCONTINUED | OUTPATIENT
Start: 2019-01-01 | End: 2019-01-01 | Stop reason: HOSPADM

## 2019-01-01 RX ORDER — LABETALOL HYDROCHLORIDE 5 MG/ML
20 INJECTION, SOLUTION INTRAVENOUS ONCE
Status: COMPLETED | OUTPATIENT
Start: 2019-01-01 | End: 2019-01-01

## 2019-01-01 RX ORDER — LIDOCAINE HYDROCHLORIDE 10 MG/ML
INJECTION, SOLUTION INFILTRATION; PERINEURAL AS NEEDED
Status: DISCONTINUED | OUTPATIENT
Start: 2019-01-01 | End: 2019-01-01 | Stop reason: HOSPADM

## 2019-01-01 RX ORDER — NOREPINEPHRINE BIT/0.9 % NACL 8 MG/250ML
INFUSION BOTTLE (ML) INTRAVENOUS
Status: DISPENSED
Start: 2019-01-01 | End: 2019-01-01

## 2019-01-01 RX ORDER — CEFUROXIME AXETIL 250 MG/1
250 TABLET ORAL EVERY 12 HOURS SCHEDULED
Qty: 2 TABLET | Refills: 0 | Status: SHIPPED | OUTPATIENT
Start: 2019-01-01 | End: 2019-01-01

## 2019-01-01 RX ORDER — ALLOPURINOL 100 MG/1
100 TABLET ORAL DAILY
Status: DISCONTINUED | OUTPATIENT
Start: 2019-01-01 | End: 2019-01-01 | Stop reason: HOSPADM

## 2019-01-01 RX ORDER — FAMOTIDINE 20 MG/1
20 TABLET, FILM COATED ORAL
Status: CANCELLED | OUTPATIENT
Start: 2019-01-01

## 2019-01-01 RX ADMIN — AMIODARONE HYDROCHLORIDE 200 MG: 200 TABLET ORAL at 12:55

## 2019-01-01 RX ADMIN — IPRATROPIUM BROMIDE AND ALBUTEROL SULFATE 3 ML: 2.5; .5 SOLUTION RESPIRATORY (INHALATION) at 03:39

## 2019-01-01 RX ADMIN — PROPOFOL 50 MG: 10 INJECTION, EMULSION INTRAVENOUS at 11:02

## 2019-01-01 RX ADMIN — PROPOFOL 50 MG: 10 INJECTION, EMULSION INTRAVENOUS at 11:49

## 2019-01-01 RX ADMIN — PHENYLEPHRINE HYDROCHLORIDE 0.5 MCG/KG/MIN: 10 INJECTION INTRAVENOUS at 21:36

## 2019-01-01 RX ADMIN — NOREPINEPHRINE BITARTRATE 0.3 MCG/KG/MIN: 8 SOLUTION at 23:04

## 2019-01-01 RX ADMIN — SODIUM CHLORIDE 500 ML: 9 INJECTION, SOLUTION INTRAVENOUS at 22:29

## 2019-01-01 RX ADMIN — CLOPIDOGREL BISULFATE 300 MG: 75 TABLET ORAL at 08:36

## 2019-01-01 RX ADMIN — SODIUM CHLORIDE 125 MG: 9 INJECTION, SOLUTION INTRAVENOUS at 10:43

## 2019-01-01 RX ADMIN — SODIUM CHLORIDE, PRESERVATIVE FREE 3 ML: 5 INJECTION INTRAVENOUS at 21:11

## 2019-01-01 RX ADMIN — IPRATROPIUM BROMIDE AND ALBUTEROL SULFATE 3 ML: 2.5; .5 SOLUTION RESPIRATORY (INHALATION) at 19:02

## 2019-01-01 RX ADMIN — PHENYLEPHRINE HYDROCHLORIDE 3 MCG/KG/MIN: 10 INJECTION INTRAVENOUS at 02:40

## 2019-01-01 RX ADMIN — ALLOPURINOL 100 MG: 100 TABLET ORAL at 14:23

## 2019-01-01 RX ADMIN — SODIUM CHLORIDE, PRESERVATIVE FREE 3 ML: 5 INJECTION INTRAVENOUS at 08:46

## 2019-01-01 RX ADMIN — PHENYLEPHRINE HYDROCHLORIDE 3 MCG/KG/MIN: 10 INJECTION INTRAVENOUS at 12:16

## 2019-01-01 RX ADMIN — CILOSTAZOL 100 MG: 50 TABLET ORAL at 12:56

## 2019-01-01 RX ADMIN — CHLORHEXIDINE GLUCONATE 15 ML: 1.2 RINSE ORAL at 08:46

## 2019-01-01 RX ADMIN — ETOMIDATE 30 MG: 2 INJECTION, SOLUTION INTRAVENOUS at 18:03

## 2019-01-01 RX ADMIN — SODIUM CHLORIDE, POTASSIUM CHLORIDE, SODIUM LACTATE AND CALCIUM CHLORIDE 150 ML/HR: 600; 310; 30; 20 INJECTION, SOLUTION INTRAVENOUS at 21:12

## 2019-01-01 RX ADMIN — SODIUM CHLORIDE 1000 ML: 9 INJECTION, SOLUTION INTRAVENOUS at 20:24

## 2019-01-01 RX ADMIN — CEFTRIAXONE SODIUM 1 G: 1 INJECTION, SOLUTION INTRAVENOUS at 03:02

## 2019-01-01 RX ADMIN — METOPROLOL SUCCINATE 50 MG: 50 TABLET, EXTENDED RELEASE ORAL at 09:58

## 2019-01-01 RX ADMIN — PREDNISONE 40 MG: 20 TABLET ORAL at 14:23

## 2019-01-01 RX ADMIN — PROPOFOL 50 MG: 10 INJECTION, EMULSION INTRAVENOUS at 11:18

## 2019-01-01 RX ADMIN — PROPOFOL 30 MG: 10 INJECTION, EMULSION INTRAVENOUS at 11:24

## 2019-01-01 RX ADMIN — PROPOFOL 30 MG: 10 INJECTION, EMULSION INTRAVENOUS at 11:39

## 2019-01-01 RX ADMIN — SODIUM CHLORIDE, POTASSIUM CHLORIDE, SODIUM LACTATE AND CALCIUM CHLORIDE: 600; 310; 30; 20 INJECTION, SOLUTION INTRAVENOUS at 10:46

## 2019-01-01 RX ADMIN — IPRATROPIUM BROMIDE AND ALBUTEROL SULFATE 3 ML: 2.5; .5 SOLUTION RESPIRATORY (INHALATION) at 00:20

## 2019-01-01 RX ADMIN — MORPHINE SULFATE 2 MG: 2 INJECTION, SOLUTION INTRAMUSCULAR; INTRAVENOUS at 13:54

## 2019-01-01 RX ADMIN — SODIUM CHLORIDE, POTASSIUM CHLORIDE, SODIUM LACTATE AND CALCIUM CHLORIDE 150 ML/HR: 600; 310; 30; 20 INJECTION, SOLUTION INTRAVENOUS at 04:00

## 2019-01-01 RX ADMIN — SODIUM BICARBONATE 50 MEQ: 84 INJECTION, SOLUTION INTRAVENOUS at 06:41

## 2019-01-01 RX ADMIN — POLYETHYLENE GLYCOL 3350, SODIUM SULFATE, SODIUM CHLORIDE, POTASSIUM CHLORIDE, ASCORBIC ACID, SODIUM ASCORBATE 1000 ML: KIT at 04:19

## 2019-01-01 RX ADMIN — NOREPINEPHRINE BITARTRATE 0.3 MCG/KG/MIN: 8 SOLUTION at 05:10

## 2019-01-01 RX ADMIN — PROPOFOL 170 MG: 10 INJECTION, EMULSION INTRAVENOUS at 15:47

## 2019-01-01 RX ADMIN — DOCUSATE SODIUM 100 MG: 50 LIQUID ORAL at 08:45

## 2019-01-01 RX ADMIN — HYDROCORTISONE SODIUM SUCCINATE 100 MG: 100 INJECTION, POWDER, FOR SOLUTION INTRAMUSCULAR; INTRAVENOUS at 22:07

## 2019-01-01 RX ADMIN — ATORVASTATIN CALCIUM 40 MG: 40 TABLET, FILM COATED ORAL at 20:36

## 2019-01-01 RX ADMIN — PANTOPRAZOLE SODIUM 40 MG: 40 INJECTION, POWDER, FOR SOLUTION INTRAVENOUS at 09:11

## 2019-01-01 RX ADMIN — PROPOFOL 50 MG: 10 INJECTION, EMULSION INTRAVENOUS at 15:38

## 2019-01-01 RX ADMIN — PHENYLEPHRINE HYDROCHLORIDE 3 MCG/KG/MIN: 10 INJECTION INTRAVENOUS at 09:24

## 2019-01-01 RX ADMIN — HYDRALAZINE HYDROCHLORIDE 10 MG: 20 INJECTION INTRAMUSCULAR; INTRAVENOUS at 03:02

## 2019-01-01 RX ADMIN — IPRATROPIUM BROMIDE AND ALBUTEROL SULFATE 3 ML: 2.5; .5 SOLUTION RESPIRATORY (INHALATION) at 07:46

## 2019-01-01 RX ADMIN — PANTOPRAZOLE SODIUM 40 MG: 40 INJECTION, POWDER, FOR SOLUTION INTRAVENOUS at 05:09

## 2019-01-01 RX ADMIN — ALBUMIN HUMAN 250 ML: 0.05 INJECTION, SOLUTION INTRAVENOUS at 02:05

## 2019-01-01 RX ADMIN — PROPOFOL 30 MG: 10 INJECTION, EMULSION INTRAVENOUS at 11:30

## 2019-01-01 RX ADMIN — SODIUM BICARBONATE 50 MEQ: 84 INJECTION, SOLUTION INTRAVENOUS at 02:58

## 2019-01-01 RX ADMIN — FENTANYL CITRATE 50 MCG/HR: 50 INJECTION, SOLUTION INTRAMUSCULAR; INTRAVENOUS at 18:23

## 2019-01-01 RX ADMIN — SODIUM CHLORIDE, POTASSIUM CHLORIDE, SODIUM LACTATE AND CALCIUM CHLORIDE: 600; 310; 30; 20 INJECTION, SOLUTION INTRAVENOUS at 15:33

## 2019-01-01 RX ADMIN — LIDOCAINE HYDROCHLORIDE 50 MG: 10 INJECTION, SOLUTION EPIDURAL; INFILTRATION; INTRACAUDAL; PERINEURAL at 15:38

## 2019-01-01 RX ADMIN — ALLOPURINOL 100 MG: 100 TABLET ORAL at 20:36

## 2019-01-01 RX ADMIN — SODIUM CHLORIDE 50 ML/HR: 9 INJECTION, SOLUTION INTRAVENOUS at 07:41

## 2019-01-01 RX ADMIN — IPRATROPIUM BROMIDE AND ALBUTEROL SULFATE 3 ML: 2.5; .5 SOLUTION RESPIRATORY (INHALATION) at 07:05

## 2019-01-01 RX ADMIN — SODIUM CHLORIDE, POTASSIUM CHLORIDE, SODIUM LACTATE AND CALCIUM CHLORIDE 150 ML/HR: 600; 310; 30; 20 INJECTION, SOLUTION INTRAVENOUS at 10:34

## 2019-01-01 RX ADMIN — LORAZEPAM 2 MG: 2 INJECTION INTRAMUSCULAR; INTRAVENOUS at 09:25

## 2019-01-01 RX ADMIN — IPRATROPIUM BROMIDE AND ALBUTEROL SULFATE 3 ML: 2.5; .5 SOLUTION RESPIRATORY (INHALATION) at 19:43

## 2019-01-01 RX ADMIN — DOCUSATE SODIUM 100 MG: 50 LIQUID ORAL at 22:07

## 2019-01-01 RX ADMIN — BUDESONIDE 0.5 MG: 0.5 INHALANT RESPIRATORY (INHALATION) at 07:45

## 2019-01-01 RX ADMIN — SODIUM CHLORIDE, POTASSIUM CHLORIDE, SODIUM LACTATE AND CALCIUM CHLORIDE 20 ML/HR: 600; 310; 30; 20 INJECTION, SOLUTION INTRAVENOUS at 13:42

## 2019-01-01 RX ADMIN — CHLORHEXIDINE GLUCONATE 15 ML: 1.2 RINSE ORAL at 21:12

## 2019-01-01 RX ADMIN — SODIUM CHLORIDE 100 ML/HR: 9 INJECTION, SOLUTION INTRAVENOUS at 03:02

## 2019-01-01 RX ADMIN — PROPOFOL 50 MG: 10 INJECTION, EMULSION INTRAVENOUS at 11:06

## 2019-01-01 RX ADMIN — CEFTRIAXONE SODIUM 1 G: 1 INJECTION, SOLUTION INTRAVENOUS at 20:36

## 2019-01-01 RX ADMIN — AMIODARONE HYDROCHLORIDE 200 MG: 200 TABLET ORAL at 08:39

## 2019-01-01 RX ADMIN — BUDESONIDE 0.5 MG: 0.5 INHALANT RESPIRATORY (INHALATION) at 07:46

## 2019-01-01 RX ADMIN — POLYETHYLENE GLYCOL 3350, SODIUM SULFATE, SODIUM CHLORIDE, POTASSIUM CHLORIDE, ASCORBIC ACID, SODIUM ASCORBATE 1000 ML: KIT at 19:19

## 2019-01-01 RX ADMIN — BUDESONIDE 0.5 MG: 0.5 INHALANT RESPIRATORY (INHALATION) at 19:43

## 2019-01-01 RX ADMIN — IPRATROPIUM BROMIDE AND ALBUTEROL SULFATE 3 ML: 2.5; .5 SOLUTION RESPIRATORY (INHALATION) at 07:45

## 2019-01-01 RX ADMIN — LISINOPRIL 20 MG: 20 TABLET ORAL at 12:55

## 2019-01-01 RX ADMIN — IPRATROPIUM BROMIDE AND ALBUTEROL SULFATE 3 ML: 2.5; .5 SOLUTION RESPIRATORY (INHALATION) at 12:17

## 2019-01-01 RX ADMIN — SODIUM CHLORIDE 100 ML/HR: 9 INJECTION, SOLUTION INTRAVENOUS at 17:38

## 2019-01-01 RX ADMIN — TAZOBACTAM SODIUM AND PIPERACILLIN SODIUM 3.38 G: 375; 3 INJECTION, SOLUTION INTRAVENOUS at 05:10

## 2019-01-01 RX ADMIN — METRONIDAZOLE 500 MG: 500 INJECTION, SOLUTION INTRAVENOUS at 03:28

## 2019-01-01 RX ADMIN — NYSTATIN: 100000 POWDER TOPICAL at 08:39

## 2019-01-01 RX ADMIN — FENTANYL CITRATE 100 MCG/HR: 50 INJECTION, SOLUTION INTRAMUSCULAR; INTRAVENOUS at 12:36

## 2019-01-01 RX ADMIN — VANCOMYCIN HYDROCHLORIDE 2250 MG: 10 INJECTION, POWDER, LYOPHILIZED, FOR SOLUTION INTRAVENOUS at 20:23

## 2019-01-01 RX ADMIN — ALBUMIN HUMAN 12.5 G: 0.05 INJECTION, SOLUTION INTRAVENOUS at 01:06

## 2019-01-01 RX ADMIN — PHENYLEPHRINE HYDROCHLORIDE 3 MCG/KG/MIN: 10 INJECTION INTRAVENOUS at 06:27

## 2019-01-01 RX ADMIN — NYSTATIN: 100000 POWDER TOPICAL at 20:45

## 2019-01-01 RX ADMIN — LABETALOL 20 MG/4 ML (5 MG/ML) INTRAVENOUS SYRINGE 20 MG: at 14:20

## 2019-01-01 RX ADMIN — DOPAMINE HYDROCHLORIDE 2 MCG/KG/MIN: 160 INJECTION, SOLUTION INTRAVENOUS at 03:00

## 2019-01-01 RX ADMIN — PANTOPRAZOLE SODIUM 40 MG: 40 INJECTION, POWDER, FOR SOLUTION INTRAVENOUS at 08:39

## 2019-01-01 RX ADMIN — SODIUM CHLORIDE, PRESERVATIVE FREE 3 ML: 5 INJECTION INTRAVENOUS at 08:40

## 2019-01-01 RX ADMIN — Medication 10 MG: at 05:10

## 2019-01-01 RX ADMIN — METOPROLOL SUCCINATE 12.5 MG: 25 TABLET, EXTENDED RELEASE ORAL at 08:39

## 2019-01-01 RX ADMIN — Medication 10 MG: at 21:51

## 2019-01-01 RX ADMIN — SODIUM CHLORIDE, POTASSIUM CHLORIDE, SODIUM LACTATE AND CALCIUM CHLORIDE 20 ML/HR: 600; 310; 30; 20 INJECTION, SOLUTION INTRAVENOUS at 09:34

## 2019-01-01 RX ADMIN — LISINOPRIL 20 MG: 20 TABLET ORAL at 12:23

## 2019-01-01 RX ADMIN — GLYCOPYRROLATE 0.4 MG: 0.2 INJECTION, SOLUTION INTRAMUSCULAR; INTRAVENOUS at 13:13

## 2019-01-01 RX ADMIN — PROPOFOL 50 MG: 10 INJECTION, EMULSION INTRAVENOUS at 11:11

## 2019-01-01 RX ADMIN — HYDROCORTISONE SODIUM SUCCINATE 100 MG: 100 INJECTION, POWDER, FOR SOLUTION INTRAMUSCULAR; INTRAVENOUS at 05:09

## 2019-01-01 RX ADMIN — NOREPINEPHRINE BITARTRATE 0.14 MCG/KG/MIN: 8 SOLUTION at 17:38

## 2019-01-01 RX ADMIN — SODIUM BICARBONATE 50 MEQ: 84 INJECTION INTRAVENOUS at 02:52

## 2019-01-01 RX ADMIN — ASPIRIN 81 MG CHEWABLE TABLET 81 MG: 81 TABLET CHEWABLE at 08:36

## 2019-01-01 RX ADMIN — IPRATROPIUM BROMIDE AND ALBUTEROL SULFATE 3 ML: 2.5; .5 SOLUTION RESPIRATORY (INHALATION) at 20:34

## 2019-01-01 RX ADMIN — TAZOBACTAM SODIUM AND PIPERACILLIN SODIUM 3.38 G: 375; 3 INJECTION, SOLUTION INTRAVENOUS at 21:31

## 2019-01-01 RX ADMIN — PROPOFOL 20 MG: 10 INJECTION, EMULSION INTRAVENOUS at 10:59

## 2019-01-01 RX ADMIN — PANTOPRAZOLE SODIUM 40 MG: 40 INJECTION, POWDER, FOR SOLUTION INTRAVENOUS at 20:36

## 2019-01-01 RX ADMIN — METHYLPREDNISOLONE SODIUM SUCCINATE 80 MG: 40 INJECTION, POWDER, FOR SOLUTION INTRAMUSCULAR; INTRAVENOUS at 20:04

## 2019-01-01 RX ADMIN — PROPOFOL 80 MG: 10 INJECTION, EMULSION INTRAVENOUS at 10:54

## 2019-01-01 RX ADMIN — CILOSTAZOL 100 MG: 50 TABLET ORAL at 08:39

## 2019-01-01 RX ADMIN — NOREPINEPHRINE BITARTRATE 0.3 MCG/KG/MIN: 8 SOLUTION at 09:24

## 2019-01-03 NOTE — H&P (VIEW-ONLY)
Anne-Marie Coronado  1945  [id number]                        CHIEF COMPLAINT:  Asymptomatic carotid stenosis; basilar somewhat aneurysm          MEDICAL HISTORY SINCE LAST ENCOUNTER:  This 73-year-old female reports subsequent the diagnostic studies showing a high-grade stenosis of the left internal carotid artery and a significant basilar somewhat aneurysm.            Past Medical History:   Diagnosis Date   • Arthritis    • Atrial fibrillation (CMS/HCC)    • Bleeding disorder (CMS/HCC)    • Bronchitis    • Hearing loss    • History of colitis    • History of gallstones    • Hyperlipidemia    • Hypertension               Past Surgical History:   Procedure Laterality Date   • CEREBRAL ANGIOGRAM N/A 12/13/2018    Procedure: Cerebral angiogram;  Surgeon: Trav Cedillo MD;  Location: [facility] INVASIVE LOCATION;  Service: Interventional Radiology   • TONSILLECTOMY     • TUBAL ABDOMINAL LIGATION     • VEIN SURGERY  2011    Vein Graph @ [facility]            History reviewed. No pertinent family history.           Social History     Socioeconomic History   • Marital status:      Spouse name: Not on file   • Number of children: Not on file   • Years of education: Not on file   • Highest education level: Not on file   Social Needs   • Financial resource strain: Not on file   • Food insecurity - worry: Not on file   • Food insecurity - inability: Not on file   • Transportation needs - medical: Not on file   • Transportation needs - non-medical: Not on file   Occupational History   • Not on file   Tobacco Use   • Smoking status: Current Every Day Smoker     Packs/day: 0.25     Types: Cigarettes   • Smokeless tobacco: Never Used   Substance and Sexual Activity   • Alcohol use: No     Frequency: Never   • Drug use: No   • Sexual activity: Defer   Other Topics Concern   • Not on file   Social History Narrative   • Not on file            No Known Allergies           Current Outpatient Medications:   •  albuterol  "(PROVENTIL HFA;VENTOLIN HFA;PROAIR HFA) 108 (90 Base) MCG/ACT inhaler, Inhale 2 puffs Every 4 (Four) Hours As Needed for Wheezing., Disp: , Rfl:   •  allopurinol (ZYLOPRIM) 100 MG tablet, Take 1 tablet by mouth As Needed., Disp: , Rfl: 0  •  amiodarone (PACERONE) 200 MG tablet, Take 200 mg by mouth Daily., Disp: , Rfl: 5  •  atorvastatin (LIPITOR) 40 MG tablet, Take 1 tablet by mouth Daily., Disp: , Rfl:   •  budesonide (PULMICORT) 90 MCG/ACT inhaler, Inhale 1 puff 2 (Two) Times a Day., Disp: , Rfl:   •  cilostazol (PLETAL) 100 MG tablet, Take 1 tablet by mouth Daily., Disp: , Rfl:   •  CloNIDine (CATAPRES) 0.1 MG tablet, Take 1 tablet by mouth As Needed., Disp: , Rfl:   •  ELIQUIS 5 MG tablet tablet, Take 1 tablet by mouth 2 (Two) Times a Day., Disp: , Rfl:   •  levalbuterol (XOPENEX) 1.25 MG/3ML nebulizer solution, As Needed., Disp: , Rfl:   •  lisinopril-hydrochlorothiazide (PRINZIDE,ZESTORETIC) 20-12.5 MG per tablet, Take 1 tablet by mouth 2 (Two) Times a Day., Disp: , Rfl:   •  metoprolol succinate XL (TOPROL-XL) 25 MG 24 hr tablet, Take 0.5 tablets by mouth 2 (Two) Times a Day. for blood pressure., Disp: , Rfl: 5  •  terazosin (HYTRIN) 10 MG capsule, Take 10 mg by mouth Daily., Disp: , Rfl: 2         Review of Systems   Respiratory: Positive for wheezing.    Musculoskeletal: Positive for arthralgias.   All other systems reviewed and are negative.              Vitals:    01/03/19 1354   BP: 161/76   BP Location: Right arm   Patient Position: Sitting   Pulse: 74   Resp: 18   Temp: 98 °F (36.7 °C)   SpO2: 99%   Weight: 81.6 kg (180 lb)   Height: 160 cm (63\")               EXAMINATION: Alert, oriented without weakness, sensory loss or reflex asymmetry.             MEDICAL DECISION MAKING:  I've recommended carotid angioplasty with stenting of the left internal carotid artery.           ASSESSMENT/DISPOSITION: I've discussed this with Dr. sinclair and we will proceed.               I APPRECIATE THE OPPORTUNITY OF THIS " REFERRAL. PLEASE CALL IF ANY       QUESTIONS [phone]

## 2019-01-03 NOTE — PROGRESS NOTES
Subjective   Anne-Marie Coronado is a 73 y.o. female.     History of Present Illness     {Common H&P Review Areas:52588}    Review of Systems   Respiratory: Positive for wheezing.    Gastrointestinal: Positive for constipation.   Musculoskeletal: Positive for arthralgias.   All other systems reviewed and are negative.      Objective   Physical Exam      Assessment/Plan   {Assess/PlanSmartLinks:79174}

## 2019-01-03 NOTE — PROGRESS NOTES
Anne-Marie Coronado  1945  [id number]                        CHIEF COMPLAINT:  Asymptomatic carotid stenosis; basilar somewhat aneurysm          MEDICAL HISTORY SINCE LAST ENCOUNTER:  This 73-year-old female reports subsequent the diagnostic studies showing a high-grade stenosis of the left internal carotid artery and a significant basilar somewhat aneurysm.            Past Medical History:   Diagnosis Date   • Arthritis    • Atrial fibrillation (CMS/HCC)    • Bleeding disorder (CMS/HCC)    • Bronchitis    • Hearing loss    • History of colitis    • History of gallstones    • Hyperlipidemia    • Hypertension               Past Surgical History:   Procedure Laterality Date   • CEREBRAL ANGIOGRAM N/A 12/13/2018    Procedure: Cerebral angiogram;  Surgeon: Trav Cedillo MD;  Location: [facility] INVASIVE LOCATION;  Service: Interventional Radiology   • TONSILLECTOMY     • TUBAL ABDOMINAL LIGATION     • VEIN SURGERY  2011    Vein Graph @ [facility]            History reviewed. No pertinent family history.           Social History     Socioeconomic History   • Marital status:      Spouse name: Not on file   • Number of children: Not on file   • Years of education: Not on file   • Highest education level: Not on file   Social Needs   • Financial resource strain: Not on file   • Food insecurity - worry: Not on file   • Food insecurity - inability: Not on file   • Transportation needs - medical: Not on file   • Transportation needs - non-medical: Not on file   Occupational History   • Not on file   Tobacco Use   • Smoking status: Current Every Day Smoker     Packs/day: 0.25     Types: Cigarettes   • Smokeless tobacco: Never Used   Substance and Sexual Activity   • Alcohol use: No     Frequency: Never   • Drug use: No   • Sexual activity: Defer   Other Topics Concern   • Not on file   Social History Narrative   • Not on file            No Known Allergies           Current Outpatient Medications:   •  albuterol  "(PROVENTIL HFA;VENTOLIN HFA;PROAIR HFA) 108 (90 Base) MCG/ACT inhaler, Inhale 2 puffs Every 4 (Four) Hours As Needed for Wheezing., Disp: , Rfl:   •  allopurinol (ZYLOPRIM) 100 MG tablet, Take 1 tablet by mouth As Needed., Disp: , Rfl: 0  •  amiodarone (PACERONE) 200 MG tablet, Take 200 mg by mouth Daily., Disp: , Rfl: 5  •  atorvastatin (LIPITOR) 40 MG tablet, Take 1 tablet by mouth Daily., Disp: , Rfl:   •  budesonide (PULMICORT) 90 MCG/ACT inhaler, Inhale 1 puff 2 (Two) Times a Day., Disp: , Rfl:   •  cilostazol (PLETAL) 100 MG tablet, Take 1 tablet by mouth Daily., Disp: , Rfl:   •  CloNIDine (CATAPRES) 0.1 MG tablet, Take 1 tablet by mouth As Needed., Disp: , Rfl:   •  ELIQUIS 5 MG tablet tablet, Take 1 tablet by mouth 2 (Two) Times a Day., Disp: , Rfl:   •  levalbuterol (XOPENEX) 1.25 MG/3ML nebulizer solution, As Needed., Disp: , Rfl:   •  lisinopril-hydrochlorothiazide (PRINZIDE,ZESTORETIC) 20-12.5 MG per tablet, Take 1 tablet by mouth 2 (Two) Times a Day., Disp: , Rfl:   •  metoprolol succinate XL (TOPROL-XL) 25 MG 24 hr tablet, Take 0.5 tablets by mouth 2 (Two) Times a Day. for blood pressure., Disp: , Rfl: 5  •  terazosin (HYTRIN) 10 MG capsule, Take 10 mg by mouth Daily., Disp: , Rfl: 2         Review of Systems   Respiratory: Positive for wheezing.    Musculoskeletal: Positive for arthralgias.   All other systems reviewed and are negative.              Vitals:    01/03/19 1354   BP: 161/76   BP Location: Right arm   Patient Position: Sitting   Pulse: 74   Resp: 18   Temp: 98 °F (36.7 °C)   SpO2: 99%   Weight: 81.6 kg (180 lb)   Height: 160 cm (63\")               EXAMINATION: Alert, oriented without weakness, sensory loss or reflex asymmetry.             MEDICAL DECISION MAKING:  I've recommended carotid angioplasty with stenting of the left internal carotid artery.           ASSESSMENT/DISPOSITION: I've discussed this with Dr. sinclair and we will proceed.               I APPRECIATE THE OPPORTUNITY OF THIS " REFERRAL. PLEASE CALL IF ANY       QUESTIONS [phone]

## 2019-01-11 PROBLEM — I65.22 CAROTID STENOSIS, ASYMPTOMATIC, LEFT: Status: ACTIVE | Noted: 2019-01-01

## 2019-01-28 PROBLEM — R27.0 ATAXIA: Status: ACTIVE | Noted: 2019-01-01

## 2019-01-28 NOTE — TELEPHONE ENCOUNTER
Provider:  Heath  Caller: Anne-Marie     Phone #:  [phone]  Surgery:  Carotid Stent  Surgery Date:  1/15/19  Last visit:   1/3/19  Next visit: 1/31/19    SOLO:         Reason for call:           Pt called stating that she is having difficulty ambulating, that her feet and legs are numb and painful causing her to fall, states that she is having jerking throughout her body, wants to know if she needs to go to the ED or come in to be evaluated. Please adv

## 2019-01-28 NOTE — TELEPHONE ENCOUNTER
Spoke with pt daughter, adv her that if concern of stroke due to pt history that the Pt should be evaluated at ED per Trav Cedillo.

## 2019-01-29 PROBLEM — K92.1 MELENA: Status: ACTIVE | Noted: 2019-01-01

## 2019-01-29 PROBLEM — D64.9 NORMOCYTIC ANEMIA: Status: ACTIVE | Noted: 2019-01-01

## 2019-01-29 PROBLEM — N28.9 ACUTE RENAL INSUFFICIENCY: Status: ACTIVE | Noted: 2019-01-01

## 2019-01-29 PROBLEM — D62 ACUTE BLOOD LOSS ANEMIA: Status: ACTIVE | Noted: 2019-01-01

## 2019-01-29 PROBLEM — K92.2 GIB (GASTROINTESTINAL BLEEDING): Status: ACTIVE | Noted: 2019-01-01

## 2019-01-29 PROBLEM — T83.511A UTI (URINARY TRACT INFECTION) DUE TO URINARY INDWELLING CATHETER (HCC): Status: ACTIVE | Noted: 2019-01-01

## 2019-01-29 PROBLEM — D50.0 BLOOD LOSS ANEMIA: Status: ACTIVE | Noted: 2019-01-01

## 2019-01-29 PROBLEM — N17.9 AKI (ACUTE KIDNEY INJURY) (HCC): Status: ACTIVE | Noted: 2019-01-01

## 2019-01-29 PROBLEM — N39.0 UTI (URINARY TRACT INFECTION) DUE TO URINARY INDWELLING CATHETER (HCC): Status: ACTIVE | Noted: 2019-01-01

## 2019-01-29 PROBLEM — K92.1 GASTROINTESTINAL HEMORRHAGE WITH MELENA: Status: ACTIVE | Noted: 2019-01-01

## 2019-01-30 NOTE — ANESTHESIA POSTPROCEDURE EVALUATION
Patient: Anne-Marie Coronado    Procedure Summary     Date:  01/30/19 Room / Location:   [facility]    Anesthesia Start:  1046 Anesthesia Stop:      Procedure:  COLONOSCOPY with polypectomy  (N/A ) Diagnosis:      Surgeon:  Brunner, Mark I, MD Provider:  Jn Bacon MD    Anesthesia Type:  MAC ASA Status:  4          Anesthesia Type: MAC  Last vitals  BP   (!) 186/80 (01/30/19 0921)   Temp   97 °F (36.1 °C) (01/30/19 0759)   Pulse   79 (01/30/19 0921)   Resp   18 (01/30/19 0921)     SpO2   91 % (01/30/19 0921)     Post Anesthesia Care and Evaluation    Patient location during evaluation: PACU  Patient participation: complete - patient participated  Level of consciousness: awake and alert  Pain score: 0  Pain management: adequate  Airway patency: patent  Anesthetic complications: No anesthetic complications  PONV Status: none  Cardiovascular status: hemodynamically stable and acceptable  Respiratory status: nonlabored ventilation, acceptable and nasal cannula  Hydration status: acceptable

## 2019-01-30 NOTE — ANESTHESIA PREPROCEDURE EVALUATION
Anesthesia Evaluation     Patient summary reviewed and Nursing notes reviewed   NPO Solid Status: > 4 hours  NPO Liquid Status: > 8 hours           Airway   Mallampati: I  TM distance: >3 FB  Neck ROM: full  No difficulty expected  Dental    (+) upper dentures    Pulmonary    (+) a smoker Current, COPD (no recent MDI) moderate,   (-) shortness of breath, recent URI  Cardiovascular     ECG reviewed  Patient on routine beta blocker    (+) hypertension, dysrhythmias Paroxysmal Atrial Fib, PVD, hyperlipidemia,  carotid artery disease carotid bilateral  (-) past MI, CAD, angina, cardiac stents    ROS comment: EKG NSR Cannot rule out Anterior infarct    ECHO EF 68%  LA moderately dilated.  Moderate tricuspid valve regurgitation is present.  Mild MVR and Mild Stenosis mitral   LVDD (grade I) consistent with impaired relaxation.  Normal RV Cav Size thickness, function and motion.  Moderate pulmonary hypertension is present.    Neuro/Psych  (+) TIA,     (-) seizures, CVA    ROS Comment: cerebral aneurysm awaiting rx  GI/Hepatic/Renal/Endo    (+)  GI bleeding, renal disease ARF,   (-) diabetes, hypothyroidism    Musculoskeletal     Abdominal    Substance History      OB/GYN          Other   (+) arthritis     ROS/Med Hx Other: Hct24                Anesthesia Plan    ASA 4     MAC   (Propofol maintain MAP 65-85( aneurysm and Mitch carotid disease))  intravenous induction   Anesthetic plan, all risks, benefits, and alternatives have been provided, discussed and informed consent has been obtained with: patient.    Plan discussed with CRNA.

## 2019-01-31 NOTE — OUTREACH NOTE
Prep Survey      Responses   Facility patient discharged from?  [facility]   Is patient eligible?  Yes   Discharge diagnosis  GI bleed    Does the patient have one of the following disease processes/diagnoses(primary or secondary)?  Other   Does the patient have Home health ordered?  No   Is there a DME ordered?  No   Prep survey completed?  Yes          Kellie Carey RN

## 2019-02-01 NOTE — OUTREACH NOTE
Medical Week 1 Survey      Responses   Facility patient discharged from?  [place]   Does the patient have one of the following disease processes/diagnoses(primary or secondary)?  Other   Is there a successful TCM telephone encounter documented?  No   Week 1 attempt successful?  Yes   Call start time  1136   Call end time  1137   Discharge diagnosis  GI bleed    Is patient permission given to speak with other caregiver?  Yes   List who call center can speak with  Socorro- Daughter   Meds reviewed with patient/caregiver?  Yes   Is the patient having any side effects they believe may be caused by any medication additions or changes?  No   Does the patient have all medications ordered at discharge?  Yes   Is the patient taking all medications as directed (includes completed medication regime)?  Yes   Does the patient have a primary care provider?   Yes   Does the patient have an appointment with their PCP within 7 days of discharge?  Yes   Has the patient kept scheduled appointments due by today?  N/A   Psychosocial issues?  No   Did the patient receive a copy of their discharge instructions?  Yes   Nursing interventions  Reviewed instructions with patient   What is the patient's perception of their health status since discharge?  Same   Is the patient/caregiver able to teach back signs and symptoms related to disease process for when to call PCP?  Yes   Is the patient/caregiver able to teach back signs and symptoms related to disease process for when to call 911?  Yes   Is the patient/caregiver able to teach back the hierarchy of who to call/visit for symptoms/problems? PCP, Specialist, Home health nurse, Urgent Care, ED, 911  Yes   Week 1 call completed?  Yes          Thea Perkins RN

## 2019-02-07 NOTE — TELEPHONE ENCOUNTER
Provider:  Ata  Caller: Socorro  Phone #:   [phone]    Last visit:   1/3/19  Next visit: 2/2819    SOLO:         Reason for call:           Pt daughter called stating that her mother is still unable to walk, states that she has a constant tremor, wants to know if the unsuccessful stenting has caused blood clots in her legs, wants to speak with someone concerning the matter asap.

## 2019-02-07 NOTE — TELEPHONE ENCOUNTER
She has had weakness in both legs since her angiogram and attempted stent performed in January.  She also has significant pain in both legs.  The daughter reports that she can stand and walk but not for many steps because her legs just seem like they are Jell-O.  They have an upcoming appointment tomorrow with Dr. Littlejohn their primary care physician which is extremely important.  The patient has recently been in the hospital found to be anemic and also had a colon polyp removed.  We were not notified when she was in the hospital but the daughter reports that she was having the same symptoms during his recent admission.  She is been out of the hospital for 9 days.  I have asked the daughter to call us tomorrow after they see Dr. Littlejohn and let us know what she has to say regarding circulation in her legs.  This will help us determine if she needs further studies done here.  I told the daughter she may need to be seen here in our office on Monday.  The daughter is in agreement and will call us tomorrow after the doctor's appointment.    Kylee Askew PA-C

## 2019-02-08 NOTE — OUTREACH NOTE
Medical Week 2 Survey      Responses   Facility patient discharged from?  [facility]   Does the patient have one of the following disease processes/diagnoses(primary or secondary)?  Other   Week 2 attempt successful?  No   Unsuccessful attempts  Attempt 1          Pillo Seaman RN

## 2019-02-09 NOTE — OUTREACH NOTE
Medical Week 2 Survey      Responses   Facility patient discharged from?  [place]   Does the patient have one of the following disease processes/diagnoses(primary or secondary)?  Other   Week 2 attempt successful?  Yes   Call start time  1554   Discharge diagnosis  GI bleed    Call end time  1604   Person spoke with today (if not patient) and relationship  Socorro, daughter   Meds reviewed with patient/caregiver?  Yes   Is the patient having any side effects they believe may be caused by any medication additions or changes?  No   Does the patient have all medications ordered at discharge?  Yes   Prescription comments  Gabapentin prescript given by PCP on 2/8/19   Is the patient taking all medications as directed (includes completed medication regime)?  Yes   Comments regarding appointments  has appt for imaging on 2/11, and f/u will be determined after scan   Does the patient have a primary care provider?   Yes   Does the patient have an appointment with their PCP within 7 days of discharge?  Yes   Has the patient kept scheduled appointments due by today?  Yes   Has home health visited the patient within 72 hours of discharge?  N/A   Psychosocial issues?  No   Comments  daughter states pt is weaker than normal, including pain at nerve endings, has been seen by PCP, but was stronger at time of visit,, but weakness fluctuates, pt lives with daughter   Did the patient receive a copy of their discharge instructions?  Yes   Nursing interventions  Reviewed instructions with patient   What is the patient's perception of their health status since discharge?  Improving   Is the patient/caregiver able to teach back signs and symptoms related to disease process for when to call PCP?  Yes   Is the patient/caregiver able to teach back signs and symptoms related to disease process for when to call 911?  Yes   Is the patient/caregiver able to teach back the hierarchy of who to call/visit for symptoms/problems? PCP, Specialist,  Home health nurse, Urgent Care, ED, 911  Yes   Week 2 Call Completed?  Yes          Mary Kate Villegas RN

## 2019-02-19 NOTE — OUTREACH NOTE
Medical Week 3 Survey      Responses   Facility patient discharged from?  [facility]   Does the patient have one of the following disease processes/diagnoses(primary or secondary)?  Other   Week 3 attempt successful?  Yes   Call start time  1610   Call end time  1612   Discharge diagnosis  GI bleed    Is patient permission given to speak with other caregiver?  Yes   Person spoke with today (if not patient) and relationship  [place], daughter   Meds reviewed with patient/caregiver?  Yes   Is the patient having any side effects they believe may be caused by any medication additions or changes?  No   Is the patient taking all medications as directed (includes completed medication regime)?  Yes   Does the patient have a primary care provider?   Yes   Has the patient kept scheduled appointments due by today?  Yes   Psychosocial issues?  No   Comments  Pt had to go back for transfusion. Had U/S of legs.    What is the patient's perception of their health status since discharge?  Improving   Is the patient/caregiver able to teach back signs and symptoms related to disease process for when to call PCP?  Yes   Is the patient/caregiver able to teach back signs and symptoms related to disease process for when to call 911?  Yes   Is the patient/caregiver able to teach back the hierarchy of who to call/visit for symptoms/problems? PCP, Specialist, Home health nurse, Urgent Care, ED, 911  Yes   Week 3 Call Completed?  Yes   Revoked  No further contact(revokes)-requires comment   Graduated/Revoked comments  dtr feels no further need for calls.           Heather Harris, RN

## 2019-02-28 NOTE — PROGRESS NOTES
Anne-Marie Coronado  1945  [id number]                        CHIEF COMPLAINT:  [Asymptomatic left carotid stenosis]         MEDICAL HISTORY SINCE LAST ENCOUNTER: She has developed symptoms suggestive of ischemic vascular disease.  She has pain and weakness in her legs.  I saw her several years ago with degenerative osteoarthritis.  Not been evaluated that way since.  She has pain in the lumbosacral area.  Attempts to treat this with BAO was unsuccessful because of the anatomy.  She is now referred for left carotid endarterectomy.  The symptoms that she has in her lower extremities however have gotten significantly worse over the past several days.  She is scheduled for vascular surgery evaluation next week.           Past Medical History:   Diagnosis Date   • Arthritis    • Atrial fibrillation (CMS/HCC)    • Bleeding disorder (CMS/HCC)    • Bronchitis    • Hearing loss    • History of colitis    • History of gallstones    • Hyperlipidemia    • Hypertension    • Low back pain               Past Surgical History:   Procedure Laterality Date   • CARDIAC CATHETERIZATION     • CEREBRAL ANGIOGRAM N/A 12/13/2018    Procedure: Cerebral angiogram;  Surgeon: Trav Cedillo MD;  Location: [facility] CATH INVASIVE LOCATION;  Service: Interventional Radiology   • COLONOSCOPY N/A 1/30/2019    Procedure: COLONOSCOPY WITH POLYPECTOMY ;  Surgeon: Brunner, Mark I, MD;  Location: [facility] ENDOSCOPY;  Service: Gastroenterology   • ENDOSCOPY N/A 1/29/2019    Procedure: ESOPHAGOGASTRODUODENOSCOPY;  Surgeon: Brunner, Mark I, MD;  Location: [facility] ENDOSCOPY;  Service: Gastroenterology   • MN TCAT IV STENT CRV CRTD ART EMBOLIC PROTECJ N/A 1/15/2019    Procedure: Carotid Stent;  Surgeon: Trav Cedillo MD;  Location: [facility] CATH INVASIVE LOCATION;  Service: Interventional Radiology   • TONSILLECTOMY     • TUBAL ABDOMINAL LIGATION     • VEIN SURGERY  2011    Vein Graph @ [facility]            History reviewed. No pertinent family  history.           Social History     Socioeconomic History   • Marital status:      Spouse name: Not on file   • Number of children: Not on file   • Years of education: Not on file   • Highest education level: Not on file   Social Needs   • Financial resource strain: Not on file   • Food insecurity - worry: Not on file   • Food insecurity - inability: Not on file   • Transportation needs - medical: Not on file   • Transportation needs - non-medical: Not on file   Occupational History   • Not on file   Tobacco Use   • Smoking status: Current Every Day Smoker     Packs/day: 0.25     Types: Cigarettes   • Smokeless tobacco: Never Used   Substance and Sexual Activity   • Alcohol use: No     Frequency: Never   • Drug use: No   • Sexual activity: Defer   Other Topics Concern   • Not on file   Social History Narrative   • Not on file            No Known Allergies           Current Outpatient Medications:   •  albuterol (PROVENTIL HFA;VENTOLIN HFA;PROAIR HFA) 108 (90 Base) MCG/ACT inhaler, Inhale 2 puffs Every 4 (Four) Hours As Needed for Wheezing., Disp: , Rfl:   •  allopurinol (ZYLOPRIM) 100 MG tablet, Take 1 tablet by mouth As Needed., Disp: , Rfl: 0  •  amiodarone (PACERONE) 200 MG tablet, Take 200 mg by mouth Daily., Disp: , Rfl: 5  •  atorvastatin (LIPITOR) 40 MG tablet, Take 1 tablet by mouth Daily., Disp: , Rfl:   •  budesonide (PULMICORT) 90 MCG/ACT inhaler, Inhale 1 puff 2 (Two) Times a Day., Disp: , Rfl:   •  cilostazol (PLETAL) 100 MG tablet, Take 1 tablet by mouth Daily., Disp: , Rfl:   •  CloNIDine (CATAPRES) 0.1 MG tablet, Take 1 tablet by mouth As Needed., Disp: , Rfl:   •  levalbuterol (XOPENEX) 1.25 MG/3ML nebulizer solution, As Needed., Disp: , Rfl:   •  lisinopril-hydrochlorothiazide (PRINZIDE,ZESTORETIC) 20-12.5 MG per tablet, Take 1 tablet by mouth 2 (Two) Times a Day., Disp: , Rfl:   •  metoprolol succinate XL (TOPROL-XL) 50 MG 24 hr tablet, Take 1 tablet by mouth Daily., Disp: 30 tablet, Rfl:  "0  •  pantoprazole (PROTONIX) 40 MG EC tablet, Take 1 tablet by mouth Daily., Disp: 30 tablet, Rfl: 0  •  terazosin (HYTRIN) 10 MG capsule, Take 10 mg by mouth Daily., Disp: , Rfl: 2         Review of Systems   Respiratory: Positive for wheezing.    Musculoskeletal: Positive for back pain.   Neurological: Positive for numbness.   All other systems reviewed and are negative.              Vitals:    02/28/19 1642   Resp: 18   Temp: 98.6 °F (37 °C)   SpO2: 99%   Weight: 82.1 kg (181 lb)   Height: 162.6 cm (64\")               EXAMINATION: She has limitation of range of motion.  Her strength appears to be intact.  She has spotty sensory loss and is areflexic.            MEDICAL DECISION MAKING: I have suggested a lumbar MRI for completeness sake.  Although most likely this is related to vascular occlusive disease the possibility of spinal stenosis needs to be eliminated for certain.           ASSESSMENT/DISPOSITION: [We will schedule lumbar MRI and try to see her in the office .]              I APPRECIATE THE OPPORTUNITY OF THIS REFERRAL. PLEASE CALL IF ANY       QUESTIONS [phone]    "

## 2019-03-12 NOTE — PROGRESS NOTES
"Anne-Marie Coronado  1945  [id number]                        CHIEF COMPLAINT: Bilateral leg pain         MEDICAL HISTORY SINCE LAST ENCOUNTER: This 74-year-old vasculopath reports for follow-up visit with lumbar MRI.  She has symptoms consistent with claudication.  MRI was performed to rule out the presence of high-grade spinal stenosis as providing anatomical/clinical correlation.  She is also had some \"jerking\" of her upper and lower extremities more recently.  She has had a fall.           Past Medical History:   Diagnosis Date   • Arthritis    • Atrial fibrillation (CMS/HCC)    • Bleeding disorder (CMS/HCC)    • Bronchitis    • Hearing loss    • History of colitis    • History of gallstones    • Hyperlipidemia    • Hypertension    • Low back pain               Past Surgical History:   Procedure Laterality Date   • CARDIAC CATHETERIZATION     • CEREBRAL ANGIOGRAM N/A 12/13/2018    Procedure: Cerebral angiogram;  Surgeon: Trav Cedillo MD;  Location:  [facility] CATH INVASIVE LOCATION;  Service: Interventional Radiology   • COLONOSCOPY N/A 1/30/2019    Procedure: COLONOSCOPY WITH POLYPECTOMY ;  Surgeon: Brunner, Mark I, MD;  Location: [facility] ENDOSCOPY;  Service: Gastroenterology   • ENDOSCOPY N/A 1/29/2019    Procedure: ESOPHAGOGASTRODUODENOSCOPY;  Surgeon: Brunner, Mark I, MD;  Location: [facility] ENDOSCOPY;  Service: Gastroenterology   • [place] TCAT IV STENT CRV CRTD ART EMBOLIC PROTECJ N/A 1/15/2019    Procedure: Carotid Stent;  Surgeon: Trav Cedillo MD;  Location: [facility] CATH INVASIVE LOCATION;  Service: Interventional Radiology   • TONSILLECTOMY     • TUBAL ABDOMINAL LIGATION     • VEIN SURGERY  2011    Vein Graph @ [facility]            History reviewed. No pertinent family history.           Social History     Socioeconomic History   • Marital status:      Spouse name: Not on file   • Number of children: Not on file   • Years of education: Not on file   • Highest education level: Not on file   Social " Needs   • Financial resource strain: Not on file   • Food insecurity - worry: Not on file   • Food insecurity - inability: Not on file   • Transportation needs - medical: Not on file   • Transportation needs - non-medical: Not on file   Occupational History   • Not on file   Tobacco Use   • Smoking status: Current Every Day Smoker     Packs/day: 0.25     Types: Cigarettes   • Smokeless tobacco: Never Used   Substance and Sexual Activity   • Alcohol use: No     Frequency: Never   • Drug use: No   • Sexual activity: Defer   Other Topics Concern   • Not on file   Social History Narrative   • Not on file            No Known Allergies           Current Outpatient Medications:   •  albuterol (PROVENTIL HFA;VENTOLIN HFA;PROAIR HFA) 108 (90 Base) MCG/ACT inhaler, Inhale 2 puffs Every 4 (Four) Hours As Needed for Wheezing., Disp: , Rfl:   •  allopurinol (ZYLOPRIM) 100 MG tablet, Take 1 tablet by mouth As Needed., Disp: , Rfl: 0  •  amiodarone (PACERONE) 200 MG tablet, Take 200 mg by mouth Daily., Disp: , Rfl: 5  •  atorvastatin (LIPITOR) 40 MG tablet, Take 1 tablet by mouth Daily., Disp: , Rfl:   •  budesonide (PULMICORT) 90 MCG/ACT inhaler, Inhale 1 puff 2 (Two) Times a Day., Disp: , Rfl:   •  cilostazol (PLETAL) 100 MG tablet, Take 1 tablet by mouth Daily., Disp: , Rfl:   •  CloNIDine (CATAPRES) 0.1 MG tablet, Take 1 tablet by mouth As Needed., Disp: , Rfl:   •  levalbuterol (XOPENEX) 1.25 MG/3ML nebulizer solution, As Needed., Disp: , Rfl:   •  lisinopril-hydrochlorothiazide (PRINZIDE,ZESTORETIC) 20-12.5 MG per tablet, Take 1 tablet by mouth 2 (Two) Times a Day., Disp: , Rfl:   •  metoprolol succinate XL (TOPROL-XL) 50 MG 24 hr tablet, Take 1 tablet by mouth Daily., Disp: 30 tablet, Rfl: 0  •  pantoprazole (PROTONIX) 40 MG EC tablet, Take 1 tablet by mouth Daily., Disp: 30 tablet, Rfl: 0  •  terazosin (HYTRIN) 10 MG capsule, Take 10 mg by mouth Daily., Disp: , Rfl: 2         Review of Systems   Constitutional: Negative  for activity change, appetite change, chills, diaphoresis, fatigue, fever and unexpected weight change.   HENT: Negative for congestion, dental problem, drooling, ear discharge, ear pain, facial swelling, hearing loss, mouth sores, nosebleeds, postnasal drip, rhinorrhea, sinus pressure, sneezing, sore throat, tinnitus, trouble swallowing and voice change.    Eyes: Negative for photophobia, pain, discharge, redness, itching and visual disturbance.   Respiratory: Positive for wheezing. Negative for apnea, cough, choking, chest tightness, shortness of breath and stridor.    Cardiovascular: Negative for chest pain, palpitations and leg swelling.   Gastrointestinal: Negative for abdominal distention, abdominal pain, anal bleeding, blood in stool, constipation, diarrhea, nausea, rectal pain and vomiting.   Endocrine: Negative for cold intolerance, heat intolerance, polydipsia, polyphagia and polyuria.   Genitourinary: Negative for decreased urine volume, difficulty urinating, dysuria, enuresis, flank pain, frequency, genital sores, hematuria and urgency.   Musculoskeletal: Positive for back pain. Negative for arthralgias, gait problem, joint swelling, myalgias, neck pain and neck stiffness.   Skin: Negative for color change, pallor, rash and wound.   Allergic/Immunologic: Negative for environmental allergies, food allergies and immunocompromised state.   Neurological: Positive for numbness. Negative for dizziness, tremors, seizures, syncope, facial asymmetry, speech difficulty, weakness, light-headedness and headaches.   Hematological: Negative for adenopathy. Does not bruise/bleed easily.   Psychiatric/Behavioral: Negative for agitation, behavioral problems, confusion, decreased concentration, dysphoric mood, hallucinations, self-injury, sleep disturbance and suicidal ideas. The patient is not nervous/anxious and is not hyperactive.    All other systems reviewed and are negative.            There were no vitals filed for  this visit.            EXAMINATION: Her strength is actually quite good and formal testing.  She is perhaps a week and her hip flexion is compared to the others.  The deep tendon reflexes are a bit hyperactive yet no clonus or Babinski.            MEDICAL DECISION MAKING: The lumbar MRI shows the presence of degenerative osteoarthritic changes with mild to moderate central stenosis.  It is not sufficiently severe to provide clinical correlation.           ASSESSMENT/DISPOSITION: The MRI does not show evidence of high-grade spinal stenosis therefore surgery is not indicated.  There are signal changes throughout which are thought to be related to marrow changes.  The possibility medic static disease however has not been eliminated.  Furthermore there is a suggestion of a compression fracture at T12 which is at the very top of the lumbar MRI.  In my opinion further studies are warranted.  This includes a bone scan and cervical MRI.  I have given her a prescription of Relafen 750 mg twice daily; have ordered the appropriate diagnostic studies and will follow her in the [facility] neurosurgery clinic in 1 week.  I will keep you informed and thank you for allowing me to see her.              I APPRECIATE THE OPPORTUNITY OF THIS REFERRAL. PLEASE CALL IF ANY       QUESTIONS [phone]

## 2019-03-13 PROBLEM — J44.9 COPD (CHRONIC OBSTRUCTIVE PULMONARY DISEASE) (HCC): Status: ACTIVE | Noted: 2019-01-01

## 2019-03-13 PROBLEM — J96.22 ACUTE ON CHRONIC RESPIRATORY FAILURE WITH HYPOXIA AND HYPERCAPNIA (HCC): Status: ACTIVE | Noted: 2019-01-01

## 2019-03-13 PROBLEM — A41.9 SEPSIS (HCC): Status: ACTIVE | Noted: 2019-01-01

## 2019-03-13 PROBLEM — R57.9 SHOCK (HCC): Status: ACTIVE | Noted: 2019-01-01

## 2019-03-13 PROBLEM — K56.609 SMALL BOWEL OBSTRUCTION (HCC): Status: RESOLVED | Noted: 2019-01-01 | Resolved: 2019-01-01

## 2019-03-13 PROBLEM — J96.21 ACUTE ON CHRONIC RESPIRATORY FAILURE WITH HYPOXIA AND HYPERCAPNIA (HCC): Status: ACTIVE | Noted: 2019-01-01

## 2019-03-13 PROBLEM — I10 HYPERTENSION: Status: ACTIVE | Noted: 2019-01-01

## 2019-03-13 PROBLEM — K56.609 BOWEL OBSTRUCTION (HCC): Status: ACTIVE | Noted: 2019-01-01

## 2019-03-13 PROBLEM — K56.609 SMALL BOWEL OBSTRUCTION (HCC): Status: ACTIVE | Noted: 2019-01-01

## 2019-03-13 PROBLEM — E78.5 HYPERLIPIDEMIA: Status: ACTIVE | Noted: 2019-01-01

## 2019-03-13 NOTE — CONSULTS
Patient Name:  Anne-Marie Coronado  YOB: 1945  [id number]       Patient Care Team:  Tere Littlejohn MD as PCP - General (Internal Medicine)      General Surgery Consult Note     Date of Consultation: 03/13/19    Consulting Physician - Katelin Rocha,     Reason for Consult - Abdominal pain    Subjective     I have been asked to see  Anne-Marie Coronado , a 74 y.o. female in consultation for abdominal pain.  Ms. Coronado has a long-standing history of peripheral vascular disease status post multiple stents, COPD on home oxygen, atrial fibrillation on chronic Eliquis, with a recent history of GI bleeding.  Her family reports that this morning she complained of abdominal distention and pain.  This was fairly severe and she presented to an outside emergency department at that time.  She was found to be hypotensive, and transported to our institution for higher level of care.  Upon arrival here she was hypoxic and had to be intubated and placed on the ventilator.  She is unable to spin her history and all history is obtained from the family and the hospital chart.    Her family reports that she has not had a bowel movement in several days, and was having nausea and vomiting of nonbloody nonbilious material.  She had previous GI bleeding likely from diverticular disease and AVMs, and 4.  Was off of her Eliquis, but has long since resumed it.  Her last dose of Eliquis according to the family was this morning.  She has a history of chronic back pain and peripheral vascular disease, and was seen by both Dr. Alexander of neurosurgery as well as a CT surgeon yesterday as an outpatient.    Her last colonoscopy was performed on 1/30/2019 by Dr. Khalil, who found a AVM in the transverse colon which was ablated.  There were also numerous polyps that were removed.  Upper endoscopy showed a small polyp in the duodenum without bleeding.      Allergy: No Known Allergies    Medications:    [START ON 3/14/2019] pantoprazole 40 mg  Intravenous Q AM   piperacillin-tazobactam 3.375 g Intravenous Q8H   sodium chloride 3 mL Intravenous Q12H   [START ON 3/14/2019] vancomycin 15 mg/kg Intravenous Q18H   vancomycin 2,250 mg Intravenous Q24H       [START ON 3/15/2019] Pharmacy Consult     fentanyl 10 mcg/mL  mcg/hr Last Rate: 100 mcg/hr (03/13/19 1844)   norepinephrine 0.02-0.3 mcg/kg/min Last Rate: 0.26 mcg/kg/min (03/13/19 1813)   Pharmacy to dose vancomycin     sodium chloride 100 mL/hr Last Rate: 100 mL/hr (03/13/19 1738)     No current facility-administered medications on file prior to encounter.      Current Outpatient Medications on File Prior to Encounter   Medication Sig   • albuterol (PROVENTIL HFA;VENTOLIN HFA;PROAIR HFA) 108 (90 Base) MCG/ACT inhaler Inhale 2 puffs Every 4 (Four) Hours As Needed for Wheezing.   • allopurinol (ZYLOPRIM) 100 MG tablet Take 1 tablet by mouth As Needed.   • amiodarone (PACERONE) 200 MG tablet Take 200 mg by mouth Daily.   • atorvastatin (LIPITOR) 40 MG tablet Take 1 tablet by mouth Daily.   • budesonide (PULMICORT) 90 MCG/ACT inhaler Inhale 1 puff 2 (Two) Times a Day.   • cilostazol (PLETAL) 100 MG tablet Take 1 tablet by mouth Daily.   • CloNIDine (CATAPRES) 0.1 MG tablet Take 1 tablet by mouth As Needed.   • levalbuterol (XOPENEX) 1.25 MG/3ML nebulizer solution As Needed.   • lisinopril-hydrochlorothiazide (PRINZIDE,ZESTORETIC) 20-12.5 MG per tablet Take 1 tablet by mouth 2 (Two) Times a Day.   • metoprolol succinate XL (TOPROL-XL) 50 MG 24 hr tablet Take 1 tablet by mouth Daily.   • nabumetone (RELAFEN) 750 MG tablet Take 1 tablet by mouth 2 (Two) Times a Day.   • pantoprazole (PROTONIX) 40 MG EC tablet Take 1 tablet by mouth Daily.   • terazosin (HYTRIN) 10 MG capsule Take 10 mg by mouth Daily.       PMHx:   Past Medical History:   Diagnosis Date   • Arthritis    • Atrial fibrillation (CMS/HCC)    • Bleeding disorder (CMS/HCC)    • Bronchitis    • Hearing loss    • History of colitis    • History of  gallstones    • Hyperlipidemia    • Hypertension    • Low back pain    - Peripheral vascular disease      Past Surgical History:  Past Surgical History:   Procedure Laterality Date   • CARDIAC CATHETERIZATION     • CEREBRAL ANGIOGRAM N/A 12/13/2018    Procedure: Cerebral angiogram;  Surgeon: Trav Cedillo MD;  Location:  [facility] CATH INVASIVE LOCATION;  Service: Interventional Radiology   • COLONOSCOPY N/A 1/30/2019    Procedure: COLONOSCOPY WITH POLYPECTOMY ;  Surgeon: Brunner, Mark I, MD;  Location:  [facility] ENDOSCOPY;  Service: Gastroenterology   • ENDOSCOPY N/A 1/29/2019    Procedure: ESOPHAGOGASTRODUODENOSCOPY;  Surgeon: Brunner, Mark I, MD;  Location:  [facility] ENDOSCOPY;  Service: Gastroenterology   • MO TCAT IV STENT CRV CRTD ART EMBOLIC PROTECJ N/A 1/15/2019    Procedure: Carotid Stent;  Surgeon: Trav Cedillo MD;  Location:  [facility] CATH INVASIVE LOCATION;  Service: Interventional Radiology   • TONSILLECTOMY     • TUBAL ABDOMINAL LIGATION     • VEIN SURGERY  2011    Vein Graph @ [facility]         Family History: Noncontributory     Social History:   Tobacco use: 1/2 ppd smoker   EtOH use : Denies    Illicit drug use: Denies      Review of Systems   Review of systems could not be obtained due to   patient intubated.              Objective     Physical Exam:      Vital Signs  /63   Pulse 73   Temp 97.8 °F (36.6 °C) (Axillary)   Resp 23   LMP  (LMP Unknown)   SpO2 98%     Intake/Output Summary (Last 24 hours) at 3/13/2019 1913  Last data filed at 3/13/2019 1835  Gross per 24 hour   Intake --   Output 330 ml   Net -330 ml         Physical Exam:    Head: Normocephalic, atraumatic.   Eyes: Pupils equal, round, react to light and accommodation.   Mouth: Oral mucosa without lesions,   Neck: No masses, lymphadenopathy or carotid bruits bilaterally   CV: Rhythm  and rate regular , no  murmurs, rubs or gallops  Lungs: Rhonchi to auscultation bilaterally   Abdomen: Bowel sounds hypoactive, distended, no  surgical scars of note or hernias  Groin : No obvious hernias bilaterally   Extremities:  No cyanosis, clubbing or edema bilaterally   Lymphatics: No abnormal lymphadenopathy appreciated   Neurologic: No gross deficits       Results Review: I have personally reviewed all of the recent lab and imaging results available at this time.  Laboratory exam reveals an elevated white blood cell count of 19.2 with a left shift.  Her hemoglobin is 11.2.  Her INR is 1.97.  Her lactate is 3.3.  Creatinine is 1.5, with a CO2 of 18.  Liver function tests essentially normal.      CT scan of the abdomen and pelvis without contrast was personally viewed by me.  This demonstrates distention of the entire colon and some of the small bowel consistent with ileus.  There is no evidence of obstruction, internal hernia, pneumatosis, or ischemia.  There is atherosclerotic disease through the entire aorta including the ostia of the celiac axis as well as the superior mesenteric artery without evidence of clear obstruction.  There is no significant stranding of the mesentery.  There is no free air nor free fluid seen.  The appendix is visualized and is normal.  The gallbladder is visualized and is normal.       Assessment/Plan     Assessment and Plan:    Hospital Problem List     * (Principal) Sepsis (CMS/[facility]) -I am unsure as to the cause of her sepsis, but her abdominal findings seem most consistent with ileus, potentially from a low flow state.  There is no clear evidence of ischemia at this time, no pneumatosis, no bowel obstruction.  I would recommend aggressive resuscitation and a bowel regimen.  I agree with NG tube decompression for now.  It does not appear that the GI tract is the primary source of this problem at this time, but rather is suffering from her global hypoperfusion.  I will continue to follow this patient with you.      Atrial fibrillation (CMS/HCC)    Bilateral carotid artery stenosis    Overview Signed 11/29/2018  3:04 PM  by Sindy Monsivais MA     Added automatically from request for surgery [id number]         Hypertension        COPD (chronic obstructive pulmonary disease) (CMS/[facility])            Hyperlipidemia                      I discussed the patients findings and my recommendations with the patient and/or family, as well as the primary team     Kemar Harrlel MD  03/13/19  7:13 PM        Please note that portions of this note were completed with a voice recognition program. Efforts were made to edit the dictations, but occasionally words are mistranscribed.

## 2019-03-13 NOTE — NURSING NOTE
ACC REVIEW REPORT: [facility]        PATIENT NAME: Anne-Marie Coronado    PATIENT ID: [id number]    BED: N204    BED TYPE: ICU    BED GIVEN TO: Katherine    TIME BED GIVEN: 1550    YOB: 1945    AGE: 74    GENDER: F    PREVIOUS ADMIT TO [facility]:     PREVIOUS ADMISSION DATE:     PATIENT CLASS:     TODAY'S DATE: 3/13/2019    TRANSFER DATE: 3-13-19    ETA: 1650    TRANSFERRING FACILITY: [facility]    TRANSFERRING FACILITY PHONE # : [phone]    TRANSFERRING MD: Giovani    DATE/TIME REQUEST RECEIVED: 3-13-19@[address] [place] [address] RN: Pearl Jones    REPORT FROM: Katherine in the ED    TIME REPORT TAKEN: 1550    DIAGNOSIS: septic shock    REASON FOR TRANSFER TO [facility]: higher level of care    TRANSPORTATION: flying    CLINICAL REASON FOR TRANSFER TO [facility]: 74 y.o. Presented to the local ED via EMS ~ 1300 - bp was 60/29 & oxygen sat was 76% on RA  She has been placed on levophed  She is being treated for sepsis - possibly pneumonia  She has not had a BM x seven days and she had some vomiting at home that is described as brown       CLINICAL INFORMATION    HEIGHT:     WEIGHT:     ALLERGIES: NKA    CERON: yes    INFECTIOUS DISEASE:     ISOLATION:     LAST VITAL SIGNS:  TIME: 1545  TEMP: 98  PULSE: 118  B/P: 94/58  RESP:     LAB INFORMATION: bun 35, Cr 1.2, CRP 14, , flu A&N - neg, coags ok  Lactate 71 (range with 19 being max high), WBC 14.5  CULTURE INFORMATION:     MEDS/IV FLUIDS: #18 rt ac - SL, #20 left forearm SL, triple lumen rt. EJ-NS@125  Four liters NS given    Levophed 9mcg  zofran 4mg IV given at 1403  Rocephin 1gm IV at 1403  Tylenol suppository @ 1404  Zosyn ordered - will start prior to departure      CARDIAC SYSTEM:    CHEST PAIN: none reported    RHYTHM: ST    Is patient taking or has patient been given any drugs that could increase bleeding? yes  (Plavix, Brilinta, Effient, Eliquis, Xarelto, Warfarin, Integrilin, Angiomax)    DRUG: unknown - they think eliquis     DOSE/FREQUENCY: unknown    CARDIAC  "ENZYMES:    DATE: 3-13-19  TIME: 1300  ANGELA: 166  TROP: 0.02    CARDIAC NOTES:  Feet are mottled and cool to touch; pedal pulses are not present with the doppler  LE ultrasound is being done - results pending      RESPIRATORY SYSTEM:    LUNG SOUNDS:    ABG DATE: 3/13        ABG TIME: 1310    ABG RESULTS:    PH: 7.21  PO2: 39  PCO2: 46  HCO3: 18  O2 SAT: 60% - on RA    Repeat ABG done on six liters - results not available at time of report    OXYGEN: 6 liters    O2 SAT: 95%    RADIOLOGY RESULTS: \"possible pneumonia\"    RESPIRATORY STATUS: unlabored; they thought they might have to intubate - pt said she did not want that done - she improved and they did not have to revisit this - she does NOT have an official DNI/DNR status that they are aware of      CNS/MUSCULOSKELETAL    ALERT AND ORIENTED:  yes    CAT SCAN RESULTS: CT head was being done at time of report - disc will be sent    CNS/MUSCULOSKELETAL NOTES: unknown baseline mobility      GI//GY      ABDOMINAL PAIN: tender to palpation RUQ ; abdomen firm    VOMITING: none in the ED; yes at home - brown liquid    DIARRHEA: no BM x seven days    NAUSEA: no    CT SCAN: CT of abd/pelvis was being done at time of report    CT SCAN RESULTS: disc will be sent    GI//GY NOTES: pt only had three cc UO when howard was placed - it was dark nakia, foul smelling & cloudy - unable to send specimen    PAST MEDICAL HISTORY: COPD, chronic back pain, HTN, CAD,  HLD, vein graft (unknown details)    OTHER SYMPTOM NOTES: c/o back pain    ADDITIONAL NOTES: dopamine was ordered prn but they did not need to use this          Monique Jones RN  3/13/2019  4:10 PM  "

## 2019-03-13 NOTE — H&P
"  CRITICAL CARE ADMISSION NOTE    Chief Complaint     Shock (CMS/HCC)        Shock (CMS/HCC) sepsis vs hypovolemia    Sepsis (CMS/HCC)    Bowel obstruction (CMS/HCC) likely ileus    Acute on chronic respiratory failure with hypoxia and hypercapnia (CMS/HCC)    Atrial fibrillation (CMS/HCC)    MONIK (acute kidney injury) (CMS/HCC)    Hypertension    COPD (chronic obstructive pulmonary disease) (CMS/HCC)    Hyperlipidemia      History of Present Illness     Anne-Marie Coronado is a 74 y.o. female, current smoker, who presented to [facility] via EMS with abdominal pain, distention, nausea/vomiting, and lack of bowel movement for 7 days.  Her family noted that today she started to collapse and had blue legs and feet.  Upon arrival she had low oxygen saturations and was hypotensive.  Upon arrival she was started on norepinephrine and treatment was initiated for sepsis \"possibly pneumonia\".  Patient is seen in ICU having arrived via air ambulance.  She is awake, alert, hypotensive, and her abdomen is distended and tender.  Oxygen saturation was in the 70s upon arrival on nasal cannula.  She reports a fall last week but denies striking her head or other injuries.  She had been recently hospitalized here at [facility] from 1/29/19-1/30/19 for anemia and was seen by gastroenterology.  She had a GI bleed and was found to have colonic diverticulosis and AVMs.  She has a past medical history significant for COPD, atrial fibrillation with Eliquis, carotid stenosis, failed carotid stent placement on 1/11/19.  Her last dose of Eliquis was this morning.    She was seen in Dr. Alexander's office yesterday for leg claudication.  There was some evidence of compression fractures.  At that time Dr. Alexander ordered a bone scan and cervical MRI and she was given a prescription for Relafen.    She has COPD and was advised to use oxygen.  The family said she was \"borderline\" in regards to using oxygen but she has refused it thus far.  " She continues to smoke.      Problem List, Surgical History, Family, Social History, and ROS     Patient Active Problem List   Diagnosis   • Atrial fibrillation (CMS/[facility])   • Hearing loss   • Colitis   • Bilateral carotid artery stenosis   • Cerebral aneurysm, nonruptured   • Carotid stenosis, asymptomatic, left   • Ataxia   • MONIK (acute kidney injury) (CMS/[facility])   • GIB (gastrointestinal bleeding)   • Normocytic anemia   • UTI (urinary tract infection) due to urinary indwelling catheter (CMS/[facility])   • Melena   • Sepsis (CMS/[facility])   • Hypertension   • COPD (chronic obstructive pulmonary disease) (CMS/[facility])   • Hyperlipidemia   • Bowel obstruction (CMS/[facility]) likely ileus   • Shock (CMS/[facility]) sepsis vs hypovolemia   • Acute on chronic respiratory failure with hypoxia and hypercapnia (CMS/[facility])     Past Surgical History:   Procedure Laterality Date   • CARDIAC CATHETERIZATION     • CEREBRAL ANGIOGRAM N/A 12/13/2018    Procedure: Cerebral angiogram;  Surgeon: Trav Cedillo MD;  Location:  [facility];  Service: Interventional Radiology   • COLONOSCOPY N/A 1/30/2019    Procedure: COLONOSCOPY WITH POLYPECTOMY ;  Surgeon: Brunner, Mark I, MD;  Location:  [facility] ENDOSCOPY;  Service: Gastroenterology   • ENDOSCOPY N/A 1/29/2019    Procedure: ESOPHAGOGASTRODUODENOSCOPY;  Surgeon: Brunner, Mark I, MD;  Location:  [facility]OS[facility];  Service: Gastroenterology   • AK TCAT IV STENT CRV CRTD ART EMBOLIC PROTECJ N/A 1/15/2019    Procedure: Carotid Stent;  Surgeon: Trav Cedillo MD;  Location:  [facility];  Service: Interventional Radiology   • TONSILLECTOMY     • TUBAL ABDOMINAL LIGATION     • VEIN SURGERY  2011    Vein Graph @ [facility]       No Known Allergies  No current facility-administered medications on file prior to encounter.      Current Outpatient Medications on File Prior to Encounter   Medication Sig   • albuterol (PROVENTIL HFA;VENTOLIN HFA;PROAIR HFA) 108 (90 Base) MCG/ACT inhaler  Inhale 2 puffs Every 4 (Four) Hours As Needed for Wheezing.   • allopurinol (ZYLOPRIM) 100 MG tablet Take 1 tablet by mouth As Needed.   • amiodarone (PACERONE) 200 MG tablet Take 200 mg by mouth Daily.   • atorvastatin (LIPITOR) 40 MG tablet Take 1 tablet by mouth Daily.   • budesonide (PULMICORT) 90 MCG/ACT inhaler Inhale 1 puff 2 (Two) Times a Day.   • cilostazol (PLETAL) 100 MG tablet Take 1 tablet by mouth Daily.   • CloNIDine (CATAPRES) 0.1 MG tablet Take 1 tablet by mouth As Needed.   • levalbuterol (XOPENEX) 1.25 MG/3ML nebulizer solution As Needed.   • lisinopril-hydrochlorothiazide (PRINZIDE,ZESTORETIC) 20-12.5 MG per tablet Take 1 tablet by mouth 2 (Two) Times a Day.   • metoprolol succinate XL (TOPROL-XL) 50 MG 24 hr tablet Take 1 tablet by mouth Daily.   • nabumetone (RELAFEN) 750 MG tablet Take 1 tablet by mouth 2 (Two) Times a Day.   • pantoprazole (PROTONIX) 40 MG EC tablet Take 1 tablet by mouth Daily.   • terazosin (HYTRIN) 10 MG capsule Take 10 mg by mouth Daily.     MEDICATION LIST AND ALLERGIES REVIEWED.    History reviewed. No pertinent family history.  Social History     Tobacco Use   • Smoking status: Current Every Day Smoker     Packs/day: 0.25     Types: Cigarettes   • Smokeless tobacco: Never Used   Substance Use Topics   • Alcohol use: No     Frequency: Never   • Drug use: No     Social History     Social History Narrative   • Not on file     FAMILY AND SOCIAL HISTORY REVIEWED.    Review of Systems   Unable to perform ROS: Acuity of condition           Objective:  General Appearance:  Ill-appearing, in distress and uncomfortable.    Vital signs: (most recent): Blood pressure 111/63, pulse 73, temperature 97.8 °F (36.6 °C), temperature source Axillary, resp. rate 23, SpO2 98 %, not currently breastfeeding.    HEENT: (BiPAP mask in place)    Lungs:  Tachypnea and increased effort.  She is not in respiratory distress.  There are decreased breath sounds and wheezes.  No rales or rhonchi.     Heart: Normal rate.  Irregular rhythm.  S1 normal and S2 normal.  No murmur, gallop or friction rub.   Chest: Symmetric chest wall expansion.   Abdomen: Abdomen is distended.  Hypoactive bowel sounds.   There is generalized tenderness.     Extremities: There is dependent edema.  There is no deformity.    Neurological: Patient is alert and oriented to person, place and time.    Pupils:  Pupils are equal, round, and reactive to light.    Skin:  Cool and diaphoretic.  There is cyanosis.            Results from last 7 days   Lab Units 03/13/19  1735   WBC 10*3/mm3 19.24*   HEMOGLOBIN g/dL 11.2*   PLATELETS 10*3/mm3 271     Results from last 7 days   Lab Units 03/13/19  1735   SODIUM mmol/L 143   POTASSIUM mmol/L 4.0   CO2 mmol/L 18.0*   BUN mg/dL 36*   CREATININE mg/dL 1.56*   GLUCOSE mg/dL 168*     Estimated Creatinine Clearance: 33 mL/min (A) (by C-G formula based on SCr of 1.56 mg/dL (H)).      Results from last 7 days   Lab Units 03/13/19  1929   PH, ARTERIAL pH units 7.173*   PCO2, ARTERIAL mm Hg 49.0*   PO2 ART mm Hg 89.9     Lab Results   Component Value Date    LACTATE 3.3 (C) 03/13/2019        IMAGES:    No radiology results for the last day      I reviewed the patient's results and images.       Advance Directives: Code Status and Medical Interventions:   Ordered at: 03/13/19 8820     Code Status:    CPR     Medical Interventions (Level of Support Prior to Arrest):    Full            Assesment     Active Hospital Problems    Diagnosis   • **Shock (CMS/HCC) sepsis vs hypovolemia   • Sepsis (CMS/HCC)   • Bowel obstruction (CMS/HCC) likely ileus   • Acute on chronic respiratory failure with hypoxia and hypercapnia (CMS/HCC)   • Atrial fibrillation (CMS/HCC)   • Hypertension   • COPD (chronic obstructive pulmonary disease) (CMS/HCC)   • Hyperlipidemia   • MONIK (acute kidney injury) (CMS/HCC)           Plan/Recommendations     TRAVIS Honeycutt, [facility]  Pulmonary and Critical Care Service    74-year-old female  with a history of COPD, chronic atrial fibrillation, chronic kidney disease, hypertension, and dyslipidemia among other diagnoses.  She presents with abdominal distention, respiratory failure, and shock.  CT of the abdomen was reviewed with Dr. Harrell and reveals no clear signs of ischemic bowel or a specific point of obstruction.  The films suggest more of an ileus picture.  There is no reason to suspect a cardiac etiology to her shock.  She had a recent normal ejection fraction and cardiac enzymes on presentation were negative.  Her shock is therefore likely hypovolemic versus septic but a clear cause of her sepsis is not apparent.  Pneumonia was offered as a diagnosis but a review of her chest x-ray compared to priors reveals no consolidation.  There may be some early infiltrate in the bases versus atelectasis.    1. Admit to ICU  2. Urgent intubation.  Discussed with the patient and she is agreeable.  3. Sepsis bundle  4. NG tube for decompression  5. Norepinephrine   6. Cortisol level  7. Stress hydrocortisone  8. Obtain labs, cultures  9. Initiate empiric antimicrobial therapy  10. Hold Eliquis for now  11. I reviewed the CT images with Dr. Harrell as described above.  Appreciate his assistance.  No indication for surgical intervention at this point.  Plans are for general resuscitation and antibiotics.  12. Discussed with patient and subsequently family in detail    Critical Care time spent in direct patient care: 60 minutes (excluding procedure time, if applicable) including high complexity decision making to assess, manipulate, and support vital organ system failure in this individual who has impairment of one or more vital organ systems such that there is a high probability of imminent or life threatening deterioration in the patient’s condition.  I have seen and examined patient, performing a face-to-face diagnostic evaluation with plan of care reviewed and developed with APRN and nursing staff. I have  addended and modified the above history of present illness, physical examination, and assessment and plan to reflect my findings and impressions.    Keenan Slade MD  Pulmonary and Critical Care Medicine

## 2019-03-13 NOTE — PROGRESS NOTES
Pharmacy Consult-Vancomycin Dosing  Anne-Marie Coronado is a  74 y.o. female receiving vancomycin therapy.     Indication:sepsis  Consulting Provider:  ID Consult:     Goal Trough:15-20    Current Antimicrobial Therapy  Anti-Infectives (From admission, onward)      Ordered     Dose/Rate Route Frequency Start Stop    03/13/19 1806  vancomycin 1250 mg/250 mL 0.9% NS IVPB ([facility])     Ordering Provider:  Joe Naidu APRN    15 mg/kg × 80.7 kg  over 90 Minutes Intravenous Every 18 Hours 03/14/19 1200 03/23/19 1159    03/13/19 1727  piperacillin-tazobactam (ZOSYN) 3.375 g in iso-osmotic dextrose 50 ml (premix)     Ordering Provider:  Joe Naidu APRN    3.375 g  12.5 mL/hr over 4 Hours Intravenous Every 8 Hours 03/13/19 2000 03/23/19 1959 03/13/19 1757  vancomycin 2250 mg/500 mL 0.9% NS IVPB ([facility])     Ordering Provider:  Joe Naidu APRN    2,250 mg  over 2.5 Hours Intravenous Every 24 Hours 03/13/19 1845 03/23/19 1844    03/13/19 1757  Pharmacy to dose vancomycin     Ordering Provider:  Joe Naidu APRN     Does not apply Continuous PRN 03/13/19 1756 03/23/19 1755            Allergies  Allergies as of 03/13/2019   • (No Known Allergies)       Labs                Evaluation of Dosing     Last Dose Received in the ED/Outside Facility:   Is Patient on Dialysis or Renal Replacement:     Ht -    Wt -      CrCl cannot be calculated (Patient's most recent lab result is older than the maximum 30 days allowed.).    Intake & Output (last 3 days)       None            Microbiology and Radiology  Microbiology Results (last 10 days)       ** No results found for the last 240 hours. **            Evaluation of Level    No results found for: OSIEL BERMEO VANCORANDOM    Assessment/Plan:  PTD vanc for sepsis. Pt received 2250 mg as load. Will follow with 1250 mg q18h (est crcl 40), trough prior to 3rd dose 3/15 0530.

## 2019-03-14 PROBLEM — D50.0 BLOOD LOSS ANEMIA: Status: RESOLVED | Noted: 2019-01-01 | Resolved: 2019-01-01

## 2019-03-14 PROBLEM — N28.9 ACUTE RENAL INSUFFICIENCY: Status: RESOLVED | Noted: 2019-01-01 | Resolved: 2019-01-01

## 2019-03-14 PROBLEM — D62 ACUTE BLOOD LOSS ANEMIA: Status: RESOLVED | Noted: 2019-01-01 | Resolved: 2019-01-01

## 2019-03-14 NOTE — SIGNIFICANT NOTE
Exam confirms with auscultation zero audible heart tones and zero audible respirations. Ms.Ruby NISHI Coronado was pronounced dead at 1404. Family at bedside.  MD notified by Patient's RN.    Rosaura Ocasio RN  Clinical House Supervisor  3/14/2019 2:49 PM

## 2019-03-14 NOTE — PROGRESS NOTES
"Patient Name:  Anne-Marie Coronado  YOB: 1945  [id number]    Surgery Progress Note    Date of visit: 3/14/2019    Subjective   Subjective: Remains critically ill, maxed on Nick and Levophed, on dopamine. Minimal UOP, no BM's.         Objective     Objective:     /55   Pulse 112   Temp 99.9 °F (37.7 °C) (Axillary)   Resp 22   Ht 160 cm (62.99\")   Wt 93.2 kg (205 lb 7.5 oz)   LMP  (LMP Unknown)   SpO2 96%   BMI 36.41 kg/m²     Intake/Output Summary (Last 24 hours) at 3/14/2019 0700  Last data filed at 3/14/2019 0600  Gross per 24 hour   Intake 4090.73 ml   Output 755 ml   Net 3335.73 ml       CV:  Rhythm  regular and rate regular   L:  Rhonchi to auscultation bilaterally with wheezes  Abd:  Bowel sounds hypoactive, distended  Ext:  No cyanosis, clubbing, edema    Recent labs that are back at this time have been reviewed. Remains acidotic, WBC 12K. Lactate 2.2       Assessment/Plan     Assessment/ Plan:    Hospital Problem List     * (Principal) Shock (CMS/HCC) sepsis vs hypovolemia - Unclear etiology, but worsening. Agree with ECHO to look at cardiac function, obvious global hypoperfusion. No evidence of bowel ischemia on initial scan, but if no other etiology found, would recommend CT angio to look at mesenteric flow. I warned the patient's family that CT Angio increases the risk for renal failure given increasing creatinine, and that her overall prognosis at this point is quite poor. Even if ischemic bowel is found, she may not tolerate operation in her current state. I stressed that while our workup and resuscitation is ongoing, that the family needs to decide on what limits on care are consistent with the patient's wishes, as this may not be a recoverable insult no matter the etiology. They seem to understand.      Atrial fibrillation (CMS/HCC)    MONIK (acute kidney injury) (CMS/HCC)        Sepsis (CMS/HCC)    Hypertension        COPD (chronic obstructive pulmonary disease) (CMS/HCC)        " Hyperlipidemia        Bowel obstruction (CMS/HCC) likely ileus    Acute on chronic respiratory failure with hypoxia and hypercapnia (CMS/HCC)              Kemar Harrell MD  3/14/2019  7:00 AM    I was informed of the family's wishes at 10 Am this morning. Subsequently with comfort measures, I was informed by the nursing staff that she  early this afternoon. Will sign off.    Kemar Harrell MD  6:11 PM

## 2019-03-14 NOTE — PROGRESS NOTES
INTENSIVIST   PROGRESS NOTE     Hospital:  LOS: 1 day      S     Ms. Anne-Marie Coronado, 74 y.o. female is followed for:    Septic Shock with MODS    As an Intensivist, we provide an integrated approach to the ICU patient and family, medical management of comorbid conditions, lead interdisciplinary rounds and coordinate the care with all other services, including those from other specialists.     Interval History:  Overall condition worse than yesterday, progressive shock despite multiple pressors.     The patient's relevant past medical, surgical and social history were reviewed and updated in Epic as appropriate.     ROS  ROS cannot be reliably obtained from the patient due to her Acuity of condition.        O     Vitals:  Temp: 99.9 °F (37.7 °C) (03/14/19 0400) Temp  Min: 97.8 °F (36.6 °C)  Max: 99.9 °F (37.7 °C)   BP: 113/51 (03/14/19 1100) BP  Min: 73/50  Max: 166/68   Pulse: 102 (03/14/19 1100) Pulse  Min: 68  Max: 112   Resp: 24 (03/14/19 1100) Resp  Min: 20  Max: 26   SpO2: 92 % (03/14/19 1100) SpO2  Min: 77 %  Max: 100 %   Device: ventilator (03/14/19 1100)    Flow Rate: 6 (03/13/19 1704) Flow (L/min)  Min: 6  Max: 6     Intake/Ouptut 24 hrs (7:00AM - 6:59 AM)  Intake/Output       03/13/19 0700 - 03/14/19 0659    Intake (ml) 5460.9    Output (ml) 755    Net (ml) 4705.9    Last Weight  93.2 kg (205 lb 7.5 oz)           Medications (drips):    DOPamine Last Rate: 4 mcg/kg/min (03/14/19 0517)   fentanyl 10 mcg/mL Last Rate: 100 mcg/hr (03/14/19 0311)   norepinephrine Last Rate: 0.3 mcg/kg/min (03/14/19 0924)   phenylephrine Last Rate: 3 mcg/kg/min (03/14/19 0924)       Mechanical Ventilator:  Settings: Observed:   Mode: VC+/AC (03/14/19 1044)    Vt (Set, L): 0.365 L (03/14/19 1044)    Resp Rate (Set): 24 (03/14/19 1044) Resp Rate (Observed) Vent: 24 (03/14/19 1100)   FiO2 (%): 70 % (03/14/19 1044)    PEEP/CPAP (cm H2O): 8 cm H20 (03/14/19 1044) Plateau Pressure (cm H2O): 20 cm H2O (03/14/19 0658)    Minute  Ventilation (L/min) (Obs): 9.35 L/min (03/14/19 1044)    I:E Ratio (Obs): 1:2.00 (03/14/19 1044)     Physical Examination  Telemetry:  Rhythm: normal sinus rhythm (03/14/19 0800)  Atrial Rhythm: atrial fibrillation (03/13/19 1800)      Constitutional:  Sedated   Cardiovascular: IRR. Normal heart sounds.  No murmurs, gallop or rub.   Respiratory: No respiratory distress. Normal respiratory effort.  Normal breath sounds  Clear to auscultation and percussion.    Abdominal:  More distended than yesterday.   Extremities: No digital cyanosis. No clubbing.  Edema.   Neurological:   Non responsive to verbal stimuli, occ grimaces to deep stimuli.  Best Eye Response: 3-->(E3) to speech (03/14/19 0800)  Best Motor Response: 6-->(M6) obeys commands (03/14/19 0800)  Best Verbal Response: 1-->(V1) none (03/14/19 0800)  Keeler Coma Scale Score: 10 (03/14/19 0800)   Lines/Drains/Airways: RIJ CVC  RR Arterial Line  NG  Prabhakar   O ETT       Hematology:  Results from last 7 days   Lab Units 03/14/19  0430 03/13/19  1735   WBC 10*3/mm3 11.72* 19.24*   HEMOGLOBIN g/dL 10.9* 11.2*   MCV fL 91.4 90.0   PLATELETS 10*3/mm3 267 271       Chemistry:  Estimated Creatinine Clearance: 27.6 mL/min (A) (by C-G formula based on SCr of 1.97 mg/dL (H)).    Results from last 7 days   Lab Units 03/14/19  0430 03/13/19  1735   SODIUM mmol/L 143 143   POTASSIUM mmol/L 4.4 4.0   CHLORIDE mmol/L 115* 113*   CO2 mmol/L 17.0* 18.0*   ANION GAP mmol/L 11.0 12.0*   GLUCOSE mg/dL 133* 168*   CALCIUM mg/dL 7.4* 7.8*     Results from last 7 days   Lab Units 03/14/19  0430 03/13/19  1735   BUN mg/dL 48* 36*   CREATININE mg/dL 1.97* 1.56*     Results from last 7 days   Lab Units 03/14/19  0430   MAGNESIUM mg/dL 2.5   PHOSPHORUS mg/dL 6.6*       Hepatic:  Results from last 7 days   Lab Units 03/14/19  0430 03/13/19  1735   ALK PHOS U/L 37 51   BILIRUBIN mg/dL 0.4 0.5   ALT (SGPT) U/L 30 27   AST (SGOT) U/L 52* 30   ALBUMIN g/dL 3.11* 3.10*       Arterial Blood  Gases:  Results from last 7 days   Lab Units 03/14/19  0813 03/14/19  0606 03/14/19  0231   PH, ARTERIAL pH units 7.204* 7.146* 7.096*   PCO2, ARTERIAL mm Hg 49.1* 53.0* 54.7*   PO2 ART mm Hg 65.8* 95.5 67.9*   FIO2 % 70 70 65     Lab Results   Lab Value Date/Time    CORTISOL 35.70 03/13/2019 2119      Lab Results   Lab Value Date/Time    PROCALCITO 4.59 (H) 03/13/2019 1735     Lab Results   Lab Value Date/Time    LACTATE 2.2 (C) 03/14/2019 0430    LACTATE 2.7 (C) 03/13/2019 2209    LACTATE 3.3 (C) 03/13/2019 1735      Lab Results   Lab Value Date/Time    BLOODCX No growth at less than 24 hours 03/13/2019 1746    BLOODCX No growth at less than 24 hours 03/13/2019 1736     Lab Results   Lab Value Date/Time    URINECX No growth 03/13/2019 1829     Images:  Xr Chest 1 View    Result Date: 3/13/2019  Support lines as above. Suspect pulmonary edema. No consolidation. THIS DOCUMENT HAS BEEN ELECTRONICALLY SIGNED BY RANI EARLY MD      Echo:  Results for orders placed during the hospital encounter of 01/28/19   Adult Transthoracic Echo Complete W/ Cont if Necessary Per Protocol    Narrative · Left atrial cavity size is moderately dilated.  · Moderate tricuspid valve regurgitation is present.  · Mild mitral valve regurgitation is present.  · Estimated EF = 68%.  · Left ventricular systolic function is normal.  · Left ventricular diastolic dysfunction (grade I) consistent with   impaired relaxation.  · Normal right ventricular cavity size, wall thickness, systolic function   and septal motion noted.  · Mild mitral valve stenosis is present with functional MAC.  · Moderate pulmonary hypertension is present.  · There is no evidence of pericardial effusion.  · The aortic valve exhibits moderate - severe sclerosis without stenosis.          Results: Reviewed.  I reviewed the patient's new laboratory and imaging results.  I independently reviewed the patient's new images.    Medications: Reviewed.      Assessment/Plan   A / P  "    74 y.o.female, transferred from [facility] ED on 3/13/2019 with Sepsis (CMS/HCC) [A41.9],  abdominal pain, distention, nausea/vomiting, and lack of bowel movement for 7 days.     1.  Septic Shock with MODS  1. Probable Abdominal Catastrophe  2. Respiratory failure, acute on chronic  1. Intubated 3/13/19  2. Mechanical Ventilation  3. MONIK / CKD  4. Multiple pressors: NE, YAYO, DA  2. Chronic Atrial Fibrillation  1. On APIxaban   3. PMH: HTN  4. COPD  1. Home O2 but no compliant.  2. Current smoker.  5. Dyslipidemia   6. Colonic diverticulosis and AVM.  7. Antibiotics: Vanco, Pip Tazo, Flagyl  8. Glucose: No h/o DM    Diet: NPO Diet   Advance Directives: Code Status and Medical Interventions:   Ordered at: 03/14/19 1058     Code Status:    No CPR     Medical Interventions (Level of Support Prior to Arrest):    Comfort Measures        Assessment / Plan:    1. Discussed with Dr Harrell, may need an exploratory lap but not in conditions to tolerate surgery in her refractory shock.  2. Discussed with Family at the bedside:  1. Abdominal catastrophe, CTA would be helpful for diagnosis but relatively contraindicated with her MODS including MONIK.  2. With Shock, Respiratory and renal failure, very unlikely to survive current hospitalization, even if she goes to surgery.  3. Patient has expressed before she did not want to be \"cut\".  4. Family wants to proceed with COMFORT CARE ONLY, and plan to stop pressors and rest of support after rest of family members arrive here.  3. Disposition: Keep in ICU.    Plan of care and goals reviewed during interdisciplinary rounds.  Level of Risk is High due to:  illness with threat to life or bodily function and decision for DNR or to de-escalate care.   I discussed the patient's findings and my recommendations with family, nursing staff and consulting provider    Time: was greater than 45 minutes.    (This excludes time spent performing separately reportable procedures and services).  Patient was " at high risk of imminent or life-threatening deterioration in her condition due to Shock.     Tariq Floyd MD, FACP, FCCP, CNSC  Intensive Care Medicine, Nutrition Support and Pulmonary Medicine

## 2019-03-14 NOTE — PROCEDURES
"Insert Arterial Line  Date/Time: 3/14/2019 5:00 AM  Performed by: Aide Crabtree APRN  Authorized by: Aide Crabtree APRN   Consent: The procedure was performed in an emergent situation.  Risks and benefits: risks, benefits and alternatives were discussed  Patient identity confirmed: verbally with patient and arm band  Time out: Immediately prior to procedure a \"time out\" was called to verify the correct patient, procedure, equipment, support staff and site/side marked as required.  Preparation: Patient was prepped and draped in the usual sterile fashion.  Indications: hemodynamic monitoring  Location: left radial    Sedation:  Patient sedated: no    Carlos's test normal: yes  Needle gauge: 20  Number of attempts: 1  Post-procedure: line sutured and dressing applied  Post-procedure CMS: normal  Patient tolerance: Patient tolerated the procedure well with no immediate complications              "

## 2019-03-14 NOTE — PROGRESS NOTES
Discharge Planning Assessment  [facility]     Patient Name: Anne-Marie Coronado  MRN: [id number]  Today's Date: 3/14/2019    Admit Date: 3/13/2019    Discharge Needs Assessment     Row Name 03/14/19 1204       Living Environment    Lives With  child(kapil), adult patient lives with her daughter Socorro    Current Living Arrangements  home/apartment/condo    Provides Primary Care For  no one, unable/limited ability to care for self    Family Caregiver if Needed  child(kapil), adult    Quality of Family Relationships  supportive       Resource/Environmental Concerns    Resource/Environmental Concerns  none    Transportation Concerns  car, none       Discharge Needs Assessment    Readmission Within the Last 30 Days  no previous admission in last 30 days    Concerns to be Addressed  adjustment to diagnosis/illness    Equipment Currently Used at Home  walker, rolling;wheelchair;shower chair    Equipment Needed After Discharge  none        Discharge Plan     Row Name 03/14/19 1201       Plan    Plan  comfort measures    Plan Comments  Patient is on vent and pressors, waiting for rest of  family to arrive. Patient resides in [facility] and lives with her sarahugher Socorro.  Patient has at home a walker, wheelchair and shower chair.   Not appropriate to speak with family at this time.  Patient is comfort measures.  Information was obtained from chart.  CM available.        Destination      No service coordination in this encounter.      Durable Medical Equipment      No service coordination in this encounter.      Dialysis/Infusion      No service coordination in this encounter.      Home Medical Care      No service coordination in this encounter.      Community Resources      No service coordination in this encounter.        Expected Discharge Date and Time     Expected Discharge Date Expected Discharge Time    Mar 21, 2019         Demographic Summary     Row Name 03/14/19 1204       General Information    Admission Type   inpatient    Arrived From  hospital transferred from [facility]    Referral Source  admission list    Reason for Consult  discharge planning    Preferred Language  English       Contact Information    Permission Granted to Share Info With          Functional Status    No documentation.       Psychosocial    No documentation.       Abuse/Neglect    No documentation.       Legal    No documentation.       Substance Abuse    No documentation.       Patient Forms    No documentation.           Heather Muller RN

## 2019-03-14 NOTE — PLAN OF CARE
Problem: Patient Care Overview  Goal: Plan of Care Review  Outcome: Ongoing (interventions implemented as appropriate)   03/14/19 0878   Plan of Care Review   Progress no change   OTHER   Outcome Summary Consulted to assess patient for skin/wound concerns in perineal region. Assessment performed. At this time patient presents with labial ecchymotic areas. No open wounds or sores seen at this time. Prabhakar catheter is anchored at this time. Nothing is needed from WOC nurse at this time. Will sign off. Please reconsult or contact WOC nurse if needs arise. Thanks

## 2019-03-14 NOTE — PLAN OF CARE
Problem: Skin Injury Risk (Adult)  Goal: Identify Related Risk Factors and Signs and Symptoms  Outcome: Outcome(s) achieved Date Met: 03/13/19    Goal: Skin Health and Integrity  Outcome: Ongoing (interventions implemented as appropriate)      Problem: Fall Risk (Adult)  Goal: Identify Related Risk Factors and Signs and Symptoms  Outcome: Outcome(s) achieved Date Met: 03/13/19    Goal: Absence of Fall  Outcome: Ongoing (interventions implemented as appropriate)      Problem: Restraint, Nonbehavioral (Nonviolent)  Goal: Rationale and Justification  Outcome: Ongoing (interventions implemented as appropriate)    Goal: Nonbehavioral (Nonviolent) Restraint: Absence of Injury/Harm  Outcome: Ongoing (interventions implemented as appropriate)      Problem: Sepsis/Septic Shock (Adult)  Goal: Signs and Symptoms of Listed Potential Problems Will be Absent, Minimized or Managed (Sepsis/Septic Shock)  Outcome: Ongoing (interventions implemented as appropriate)      Problem: Ventilation, Mechanical Invasive (Adult)  Goal: Signs and Symptoms of Listed Potential Problems Will be Absent, Minimized or Managed (Ventilation, Mechanical Invasive)  Outcome: Ongoing (interventions implemented as appropriate)      Problem: Patient Care Overview  Goal: Plan of Care Review  Outcome: Ongoing (interventions implemented as appropriate)   03/13/19 2005   Coping/Psychosocial   Plan of Care Reviewed With family   Plan of Care Review   Progress declining   OTHER   Outcome Summary Patient admited from [facility] with sepsis of unknown origin. Transfeered on levophed and 6L NC.. Upon arrival to [facility] sats were in 80s and BP was 70s. Levophed continued and patiient was intubated. Lactic Acid 3.3 and Procalcitonin elevated. Dr. Harrell consulted for ABD pain. CT of ABD pelvis reviewed and bowl regimen started. NG placed with 350 ml output. UOP decreased. 1 liter bolus NS given.

## 2019-03-14 NOTE — SIGNIFICANT NOTE
Notified by nurse that patient continues to have hypotension despite fluid resuscitation and Levophed maxed. Gave patient 500ml bolus of Albumin which did not aid in hypotension. Started Neosynepherine and maxed it out without significant improvement. Repeated ABG as previous one showed acidosis. Repeat indicated worsening acidosis with ph 7.0. 2 amps bicarb added. Additionally Cheetah monitor was added and showed need for inotropic support with SVV 9 added Dopamine. SVI 51.     Additionally, with ongoing abdominal issues added Flagyl.

## 2019-03-14 NOTE — PROGRESS NOTES
"                  Clinical Nutrition     Nutrition Assessment  Reason for Visit:   MDR, Identified at risk by screening criteria    Patient Name: Anne-Marie Coronado  YOB: 1945  MRN: [id number]  Date of Encounter: 03/14/19 12:22 PM  Admission date: 3/13/2019    Nutrition Assessment   Admission Diagnosis         Shock    Atrial fibrillation (CMS/HCC)    MONIK (acute kidney injury) (CMS/HCC)    Sepsis (CMS/HCC)    Hypertension    COPD (chronic obstructive pulmonary disease) (CMS/HCC)    Hyperlipidemia    Bowel obstruction (CMS/HCC) likely ileus    Acute on chronic respiratory failure with hypoxia and hypercapnia (CMS/HCC)      PMH: She  has a past medical history of Arthritis, Atrial fibrillation (CMS/HCC), Bleeding disorder (CMS/HCC), Bronchitis, Hearing loss, History of colitis, History of gallstones, Hyperlipidemia, Hypertension, and Low back pain.   PSxH: She  has a past surgical history that includes Vein Surgery (2011); Tubal ligation; Tonsillectomy; Cardiac catheterization; COLONOSCOPY WITH POLYPECTOMY  (N/A, 1/30/2019); ESOPHAGOGASTRODUODENOSCOPY (N/A, 1/29/2019); Carotid Stent (N/A, 1/15/2019); and Cerebral angiogram (N/A, 12/13/2018).       Reported/Observed/Food/Nutrition Related History:     Per RN: pt maxed on 3 pressors,  plan now comfort measures    Anthropometrics     Height: 160 cm (62.99\")  Last filed wt: 205lb  Weight Method: Bed scale    BMI:  36.3  Obese Class II: 35-39.9kg/m2        Labs reviewed     Results from last 7 days   Lab Units 03/14/19  0430   SODIUM mmol/L 143   POTASSIUM mmol/L 4.4   CHLORIDE mmol/L 115*   CO2 mmol/L 17.0*   BUN mg/dL 48*   CREATININE mg/dL 1.97*   CALCIUM mg/dL 7.4*   BILIRUBIN mg/dL 0.4   ALK PHOS U/L 37   ALT (SGPT) U/L 30   AST (SGOT) U/L 52*   GLUCOSE mg/dL 133*           No results found for: HGBA1C      Medications reviewed   Pertinent: albumin, protonix, dopamine, levophed, tania, fentanyl           GI: suspected bowel obstruction    SKIN: labia- " ecchymotic, perineum - MASD    Current Nutrition Prescription     PO: NPO Diet  No active supplement orders           Nutrition Diagnosis     Problem No nutrition diagnosis at this time:   GOC- COMFORT MEASURES   Etiology    Signs/Symptoms      Nutrition Intervention     Interventions Goal    General: Nutrition support treatment     COMFORT MEASURES    Nutrition Interventions   1.  Follow treatment progress    Monitor/ Evaluation    Per protocol    Lety Mack RD  Time Spent: 30min

## 2019-03-14 NOTE — H&P
SEPTIC SHOCK FOCUSED EXAM    *Must be completed by a licensed independent Practitioner within 6 hours of diagnosis for the following conditions -- Septic Shock or Severe Sepsis with Lactate >/= 4.    Fluid bolus must be completed prior to assessment.      Diagnosis: Septic Shock     Vital Signs (Attestation) Reviewed Temp, HR, RR, BP, SpO2   Shock Index (HR/SBP) (Attestation) Reviewed    Urine Output (Attestation) Reviewed   General ill appearing   Respiratory decreased breath sounds and wheezing   Cardiac/Chest irregularly irregular   Skin (documentation of skin color is required) cyanotic and diaphoretic   Capillary Refill <3 seconds   Peripheral Pulses Checked                          radial  palpable     † Septic shock is defined by CMS as severe sepsis plus one of the following: persistent hypotension after fluid bolus OR tissue hypoperfusion (Lactic Acid ?4)  †† ONE OF THE FOLLOWING can be done in lieu of the Focused Exam: Measure CVP; Measure ScVO2; Echocardiogram (cardiac echo or cardiac ultrasound); Dynamic assessment of fluid responsiveness with passive leg raise or fluid challenge.    Keenan Slade MD  03/13/19  8:03 PM

## 2019-03-14 NOTE — PLAN OF CARE
Problem: Skin Injury Risk (Adult)  Goal: Skin Health and Integrity  Outcome: Ongoing (interventions implemented as appropriate)      Problem: Fall Risk (Adult)  Goal: Absence of Fall  Outcome: Ongoing (interventions implemented as appropriate)      Problem: Restraint, Nonbehavioral (Nonviolent)  Goal: Rationale and Justification  Outcome: Ongoing (interventions implemented as appropriate)    Goal: Nonbehavioral (Nonviolent) Restraint: Absence of Injury/Harm  Outcome: Ongoing (interventions implemented as appropriate)    Goal: Nonbehavioral (Nonviolent) Restraint: Achievement of Discontinuation Criteria  Outcome: Ongoing (interventions implemented as appropriate)    Goal: Nonbehavioral (Nonviolent) Restraint: Preservation of Dignity and Wellbeing  Outcome: Ongoing (interventions implemented as appropriate)      Problem: Sepsis/Septic Shock (Adult)  Goal: Signs and Symptoms of Listed Potential Problems Will be Absent, Minimized or Managed (Sepsis/Septic Shock)  Outcome: Ongoing (interventions implemented as appropriate)      Problem: Ventilation, Mechanical Invasive (Adult)  Goal: Signs and Symptoms of Listed Potential Problems Will be Absent, Minimized or Managed (Ventilation, Mechanical Invasive)  Outcome: Ongoing (interventions implemented as appropriate)      Problem: Patient Care Overview  Goal: Plan of Care Review  Outcome: Ongoing (interventions implemented as appropriate)   03/14/19 0756   Coping/Psychosocial   Plan of Care Reviewed With patient   Plan of Care Review   Progress declining   OTHER   Outcome Summary pt remains intubated and sedated. maxed on tania and levo, started on dopamine at 2 mcg/kg/min. BP 70-100s. HR 60s-110s. lactic acid down to 2.2. bicarb given x2 based on 2 am ABG and bicarb given x1 for 6 am ABG. 5% albumin also given per APRN. 125 UOP. temp max 99.9 ax. no pedal pulses, MDs aware. 300 out of NG. 1 L bolus NS given at the beginning of the shift, LR continuous fluids throughout shift.  flagyl added per APRN. art line inserted this am per APRN.      Goal: Individualization and Mutuality  Outcome: Ongoing (interventions implemented as appropriate)    Goal: Discharge Needs Assessment  Outcome: Ongoing (interventions implemented as appropriate)

## 2019-03-14 NOTE — SIGNIFICANT NOTE
Notified by charge nurse that patient has absent DP/PT pulses in bilateral feet. Both are equally yoanna in appearance. According to family, the feet appear better than they have in the recent past.

## 2019-03-15 ENCOUNTER — APPOINTMENT (OUTPATIENT)
Dept: MRI IMAGING | Facility: HOSPITAL | Age: 74
End: 2019-03-15

## 2019-03-15 LAB — BACTERIA SPEC AEROBE CULT: NORMAL

## 2019-03-15 NOTE — DISCHARGE SUMMARY
"  Death Summary         Patient Name: Anne-Marie Coronado    CSN: [id number]    MRN: [id number]    : 1945    Today's Date: 03/15/19    Date of Admission: 3/13/2019    Date/Time of Death: 3/14/2019 at 1404    Admitting Physician:  Keenan Slade MD    Primary Care Provider: Tere Littlejohn MD    Consultations:   Kemar Harrell MD, General Surgery      Admission Diagnosis: Septic Shock    Diagnoses at Time of Death:     Shock    Atrial fibrillation (CMS/HCC)    MONIK (acute kidney injury) (CMS/HCC)    Sepsis (CMS/HCC)    Hypertension    COPD (chronic obstructive pulmonary disease) (CMS/HCC)    Hyperlipidemia    Bowel obstruction (CMS/HCC) likely ileus    Acute on chronic respiratory failure with hypoxia and hypercapnia (CMS/[facility])        Procedures:     0500 Insert Arterial Line      HPI     History of Present Illness:    Anne-Marie Coronado is a 74 y.o. female, current smoker, who presented to [facility] via EMS on 3/13 with abdominal pain, distention, nausea/vomiting, and lack of bowel movement for 7 days.  Her family noted that today she started to collapse and had blue legs and feet.  Upon arrival she had low oxygen saturations and was hypotensive and was started on norepinephrine and treatment was initiated for sepsis \"possibly pneumonia\".  Patient is seen in ICU having arrived via air ambulance.  She is awake, alert, hypotensive, and her abdomen is distended and tender.  Oxygen saturation was in the 70s upon arrival on nasal cannula.  She reports a fall last week but denies striking her head or other injuries.  She had been recently hospitalized here at [facility] from 19-19 for anemia and was seen by gastroenterology.  She had a GI bleed and was found to have colonic diverticulosis and AVMs.  She has a past medical history significant for COPD, atrial fibrillation with Eliquis, carotid stenosis, failed carotid stent placement on 19.  Her last dose of Eliquis was this " "morning.     She was seen in Dr. Alexander's office yesterday for leg claudication.  There was some evidence of compression fractures.  At that time Dr. Alexander ordered a bone scan and cervical MRI and she was given a prescription for Relafen.     She has COPD and was advised to use oxygen.  The family said she was \"borderline\" in regards to using oxygen but she has refused it thus far.  She continues to smoke.        Hospital Course       Hospital Course:    The patient was admitted to the ICU and urgently intubated. She was aggressively resuscitated with IV fluids and started on stress dose steroids. General surgery was consulted but found no sign of ischemic bowel on CT scan of the abdomen. She continued to exhibit signs of progressive shock despite multiple pressors and although CT was not indicative of bowel ischemia, it was felt that she had a catastrophic abdominal insult but was not a surgical candidate secondary to MONIK and MODS. The family decided to make the patient comfort measures and withdraw support after all family members arrived. She was pronounced  at 1404 on 3/14/2019.      TRAVIS Adan  Pulmonary & Critical Care Medicine                    *Please note that portions of this note were completed with a voice recognition program. Efforts were made to edit the dictations, but occasionally words are mistranscribed.     "

## 2019-03-16 LAB
BACTERIA SPEC RESP CULT: ABNORMAL
BACTERIA SPEC RESP CULT: ABNORMAL
GRAM STN SPEC: ABNORMAL

## 2019-03-18 LAB
BACTERIA SPEC AEROBE CULT: NORMAL
BACTERIA SPEC AEROBE CULT: NORMAL

## 2019-03-19 ENCOUNTER — APPOINTMENT (OUTPATIENT)
Dept: NUCLEAR MEDICINE | Facility: HOSPITAL | Age: 74
End: 2019-03-19

## (undated) DEVICE — LIMB HOLDER, WRIST/ANKLE: Brand: DEROYAL

## (undated) DEVICE — ST ACC MICROPUNCTURE .018 TRANSLSS/SS/TP 5F/10CM 21G/7CM

## (undated) DEVICE — THE CARR-LOCKE INJECTION NEEDLE IS A SINGLE USE, DISPOSABLE, FLEXIBLE SHEATH INJECTION NEEDLE USED FOR THE INJECTION OF VARIOUS TYPES OF MEDIA THROUGH FLEXIBLE ENDOSCOPES.

## (undated) DEVICE — GRADUATE CONTN 1000ML

## (undated) DEVICE — CATH TEMPO 5F SIM 2 100CM: Brand: TEMPO

## (undated) DEVICE — CATH TEMPO 5F BER 100CM: Brand: TEMPO

## (undated) DEVICE — LONG SHEATH: Brand: AXS INFINITY LS

## (undated) DEVICE — SYR LUERLOK 50ML

## (undated) DEVICE — SOL IRR H2O BTL 1000ML STRL

## (undated) DEVICE — RADIFOCUS GLIDEWIRE: Brand: GLIDEWIRE

## (undated) DEVICE — CATH ANGIO SIM2 HNB5.0 .038 100 P NS

## (undated) DEVICE — INTRO SHEATH PRELUDE IDEAL SPRNG COIL 021 6F 23X80CM

## (undated) DEVICE — THE BITE BLOCK MAXI, LATEX FREE STRAP IS USED TO PROTECT THE ENDOSCOPE INSERTION TUBE FROM BEING BITTEN BY THE PATIENT.

## (undated) DEVICE — STPCK 3/WY HP M/RA W/OFF/HNDL 1050PSI STRL

## (undated) DEVICE — SINGLE-USE POLYPECTOMY SNARE: Brand: SENSATION SHORT THROW

## (undated) DEVICE — BIPOLAR ELECTROHEMOSTASIS CATHETER: Brand: INJECTION GOLD PROBE

## (undated) DEVICE — NDL PERC 1PRT THNWALL W/BASEPLT 18G 7CM

## (undated) DEVICE — CATH ANGIO TRCN NB ADV .038 5F 125CM VTK

## (undated) DEVICE — ROTATING HEMOSTATIC VALVE .096": Brand: RHV

## (undated) DEVICE — INTRO SHEATH ART/FEM ENGAGE .038 5F12CM

## (undated) DEVICE — SINGLE-USE BIOPSY FORCEPS: Brand: RADIAL JAW 4

## (undated) DEVICE — DEV COMP RAD PRELUDESYNC 24CM

## (undated) DEVICE — LEX NEURO ANGIOGRAPHY: Brand: MEDLINE INDUSTRIES, INC.

## (undated) DEVICE — SUCTION CANISTER, 1000CC,SAFELINER: Brand: DEROYAL